# Patient Record
Sex: FEMALE | Race: WHITE | URBAN - METROPOLITAN AREA
[De-identification: names, ages, dates, MRNs, and addresses within clinical notes are randomized per-mention and may not be internally consistent; named-entity substitution may affect disease eponyms.]

---

## 2021-06-08 ENCOUNTER — HOSPITAL ENCOUNTER (OUTPATIENT)
Dept: PHYSICAL THERAPY | Age: 81
Discharge: HOME | End: 2021-06-08
Payer: MEDICARE

## 2021-06-08 PROCEDURE — 97161 PT EVAL LOW COMPLEX 20 MIN: CPT

## 2021-06-08 PROCEDURE — 97016 VASOPNEUMATIC DEVICE THERAPY: CPT

## 2021-06-08 PROCEDURE — 97110 THERAPEUTIC EXERCISES: CPT

## 2021-06-08 NOTE — THERAPY EVALUATION
[id number]}  Jackson Funk  : 1940  [place]  Secondary:  [address]  at [address] [facility], [address] [place] [address], [address]  [phone]   [phone]        OUTPATIENT PHYSICAL THERAPY:Initial Assessment 2021   ICD-10: Treatment Diagnosis: Pain in right shoulder [M25.511]  Other chronic pain [G89.29]  Precautions/Allergies:   Patient has no allergy information on record. TREATMENT PLAN:  Effective Dates: 2021 TO 2021 (90 days). Frequency/Duration: Up to 3 times a week for 90 Day(s) MEDICAL/REFERRING DIAGNOSIS:  Pain in right shoulder [M25.511]  Other chronic pain [G89.29]   DATE OF ONSET: Acute on Chronic 2021  REFERRING PHYSICIAN: Hester Mcardle, MD MD Orders: PT eval & treat  Return MD Appointment:   Future Appointments   Date Time Provider Reginald Corrigan   6/10/2021  7:00 PM Harden Roche, PT [facility]   2021  1:00 PM Harden Roche, PT [facility]   2021 10:15 AM Harden Roche, PT [facility]   2021 10:15 AM Harden Roche, PT [facility]   2021 10:15 AM Harden Roche, PT [facility]   2021 10:15 AM Harden Roche, PT [facility]   2021 10:15 AM Harden Roche, PT [facility]        INITIAL ASSESSMENT:  Ms. Ida Lynn presents w/ R shoulder impingement secondary to scapulothoracic dysfunction w/ supraspinatus TTP modified Hagen Preet's test.  DASH is 17/55 for this patient. Exquisite tenderness right supraspinatus tendon and trigger point right infraspinatus and teres minor. Skilled PT is indicated for this patient's functional mobility deficits. PROBLEM LIST (Impacting functional limitations):  1. Decreased Strength  2. Decreased ADL/Functional Activities  3. Increased Pain  4. Decreased Flexibility/Joint Mobility INTERVENTIONS PLANNED: (Treatment may consist of any combination of the following)  1.  Continuous Passive Motion (CPM)  2. Cryotherapy  3. Home Exercise Program (HEP)  4. Manual Therapy  5. Neuromuscular Re-education/Strengthening  6. Range of Motion (ROM)  7. Therapeutic Activites  8. Therapeutic Exercise/Strengthening  9. Transcutaneous Electrical Nerve Stimulation (TENS)  10. Vasopneumatic compression      GOALS: (Goals have been discussed and agreed upon with patient.)  Short-Term Functional Goals: Time Frame: 6 weeks   1. Pt will improve R shoulder ER to 80 degrees ROM  to be able to participate in daily household activities   2. Pt will decrease pain to 3/10 pain to be able to part take in daily household activities   3. Pt will improve R shoulder IR to 60 degrees ROM to be able to dress herself regularly   Discharge Goals: Time Frame: 12 weeks   5. Pt will improve R shoulder ER to 90 degrees ROM  to be able to participate in daily household activities   6. Pt will decrease pain to 0/10 pain to be able to part take in daily household activities   7. Pt will improve R shoulder IR to 90 degrees ROM to be able to dress herself regularly. Scratch L1/T12     OUTCOME MEASURE:   Tool Used: Disabilities of the Arm, Shoulder and Hand (DASH) Questionnaire - Quick Version  Score:  Initial: 17/55  Most Recent: X/55 (Date: -- )   Interpretation of Score: The DASH is designed to measure the activities of daily living in person's with upper extremity dysfunction or pain. Each section is scored on a 1-5 scale, 5 representing the greatest disability. The scores of each section are added together for a total score of 55. MEDICAL NECESSITY:   · Patient is expected to demonstrate progress in strength, range of motion and functional technique to increase independence with Dressing, bathing and performing ADLs. REASON FOR SERVICES/OTHER COMMENTS:  · Patient has demonstrated an improvement in functional level by increasing ROM w/ isometric exercises .   Total Duration:  PT Patient Time In/Time Out  Time In: 1300  Time Out: 1400    Rehabilitation Potential For Stated Goals: Excellent  Regarding Navya Velasquez's therapy, I certify that the treatment plan above will be carried out by a therapist or under their direction. Thank you for this referral,  Annemarie Goddard PT     Referring Physician Signature: Ray Jo MD No Signature is Required for this note. PAIN/SUBJECTIVE:   Initial:   3/10  Right shoulder  Post Session:  3/10 right shoulder. HISTORY:   History of Injury/Illness (Reason for Referral):  Acute on chronic exacerbation of the R shoulder when lifting objects, and dressing when using the R shldr   Past Medical History/Comorbidities:   Ms. Juan J Higginbotham  has no past medical history on file. Ms. Juan J Higginbotham  has no past surgical history on file. 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 Federal Law prohibits the replication, distribution or use without written permission from [facility] [employer]   Current Medications:     No current outpatient medications on file.    Date Last Reviewed:  6/9/2021   Number of Personal Factors/Comorbidities that affect the Plan of Care: 0: LOW COMPLEXITY   EXAMINATION:   Palpation:    TTP supraspinatus insertion tenderness   TTP infraspinatus   TTP levator scapula/upper trap/scalenes         ROM:                                                             Right                              AROM   STRENGTH   PROM NORMALS  Flexion                    160*        4+                WNL*               160  ABDUCTION          170*        4+                WNL                 160  Extension               50           4+                 WNL                 60  ER                             50*           3+                 WNL*               85   IR                              L1           4+                  WNL*              T12  *indicates antalgic mobility   Strength:      See above  Special Tests:          Neers (-)   Empty can (-)   Modified Shanthi Peers (+) TTP supraspinatus insertion tenderness     Neurological Screen:        Myotomes: WNL        Dermatomes: WNL        Sensation: WNL  Functional Mobility:         Hand behind back limited R>L pain provocation indicated   Coordination:          N/A   Activities of Daily Living:         Reaching   Hand behind back   Arm overhead   Dressing   Driving   Sleeping behaviors lying on R side   Basic ADLs (From Assessment) Complex ADLs (From Assessment)         Grooming/Bathing/Dressing Activities of Daily Living                                    Body Structures Involved:  1. Thoracic Cage  2. Bones  3. Joints  4. Muscles  5. Ligaments Body Functions Affected:  1. Sensory/Pain  2. Neuromusculoskeletal  3. Movement Related Activities and Participation Affected:  1. Mobility  2.  Self Care   Number of elements (examined above) that affect the Plan of Care: 1-2: LOW COMPLEXITY   CLINICAL PRESENTATION:   Presentation: Stable and uncomplicated: LOW COMPLEXITY   CLINICAL DECISION MAKING:   Use of outcome tool(s) and clinical judgement create a POC that gives a: Clear prediction of patient's progress: LOW COMPLEXITY

## 2021-06-08 NOTE — PROGRESS NOTES
Karen Huerta  : 1940  [place]  Secondary:  [address]  at [facility], [facility], [address]  [phone]   [phone]        OUTPATIENT PHYSICAL THERAPY: Daily Treatment Note 2021  Visit Count:  1   ICD-10: Treatment Diagnosis: Pain in right shoulder [M25.511]  Other chronic pain [G89.29]  Precautions/Allergies:   Patient has no allergy information on record. TREATMENT PLAN:  Effective Dates: 2021 TO 2021 (90 days). Frequency/Duration: Up to 3 times a week for 90 Day(s)  Pain in right shoulder [M25.511]  Other chronic pain [G89.29]  MEDICAL/REFERRING DIAGNOSIS:  Pain in right shoulder [M25.511]  Other chronic pain [G89.29]   DATE OF ONSET: Acute on Chronic 2021  REFERRING PHYSICIAN: Verna Ordaz MD  Pre-treatment Symptoms/Complaints:  R shldr pain    Future Appointments   Date Time Provider Reginald Corrigan   6/10/2021  7:00 PM Eston Cedars, PT [facility]   2021  1:00 PM Eston Cedars, [facility]   2021 10:15 AM Eston Cedars, PT [facility]   2021 10:15 AM Eston Cedars, PT [facility]   2021 10:15 AM Eston Cedars, PT [facility]   2021 10:15 AM Eston Cedars, [facility]   2021 10:15 AM Eston Cedars, [facility]       Pain: Initial:  3/10 right shoulder and posterior scapular region  Post Session:  310 right shoulder and posterior scapular region. Medications Last Reviewed:  2021  Updated Objective Findings:  See evaluation note from today  TREATMENT:     THERAPEUTIC ACTIVITY: ( 0 minutes): Therapeutic activities per grid below to improve mobility, strength, balance and dynamic movement of shoulder - right to improve functional lifting, carrying, reaching and overhead activites.   Required no visual, verbal, manual and tactile cues to promote static and dynamic balance in standing, promote coordination of right, upper extremity(s) and promote motor control of right, upper extremity(s). THERAPEUTIC EXERCISE: (30 minutes):  Exercises per grid below to improve mobility, strength, balance, coordination and dynamic movement of shoulder - right to improve functional carrying, reaching, catching and overhead activites. Required no visual, verbal, manual and tactile cues to promote proper body alignment, promote proper body posture and promote proper body mechanics. Progressed resistance, range, repetitions and complexity of movement as indicated. NEUROMUSCULAR RE-EDUCATION: (0 minutes):  Exercise/activities per grid below to improve coordination, posture and proprioception. Required no visual, verbal, manual and tactile cues to promote static and dynamic balance in standing, promote coordination of right, upper extremity(s) and promote motor control of right, upper extremity(s). MANUAL THERAPY: (0 minutes): Joint mobilization, Soft tissue mobilization and Manipulation was utilized and necessary because of the patient's restricted joint motion, painful spasm, loss of articular motion and restricted motion of soft tissue. MODALITIES: (15 minutes):      Vasopneumatic Compression Cold 15 min intermittent moderate compression    1 unit        Date:  Planned Tx            Activity/Exercise Parameters                                UBE  8 mins 4 forward and 4    backward level 4           Prone Y's  3x10 no weight           Standing Y's  3x10 2#          Prone T's  3x10 1#          Standing band pull aparts open can  3x10 red theraband           Isometric  10 x's flexion, abduction, extension, ER, IR standing           Seated Scapular retraction  10x's/5 second holds           Access Code: [email or web address]  URL: [email or web address]  Date: 06/09/2021  Prepared by:  Arun Whiting    Exercises  Isometric Shoulder Flexion - 2 x daily - 7 x weekly - 10 reps - 1 sets - 5 hold  Isometric Shoulder External Rotation - 2 x daily - 7 x weekly - 10 reps - 1 sets - 5 hold  Isometric Shoulder Internal Rotation - 2 x daily - 7 x weekly - 10 reps - 1 sets - 5 hold  Isometric Shoulder Adduction - 2 x daily - 7 x weekly - 10 reps - 1 sets - 5 hold  Isometric Shoulder Abduction at Wall - 2 x daily - 7 x weekly - 10 reps - 1 sets - 5 hold  Isometric Shoulder Extension at Wall - 2 x daily - 7 x weekly - 10 reps - 1 sets - 5 hold  Standing Scapular Retraction - 2 x daily - 7 x weekly - 10 reps - 1 sets - 5 hold    Patient Education  Ice  Shoulder Impingement    MedBridge Portal  Treatment/Session Summary:    · Response to Treatment:  Pt tolerated tx well . Patient perception of improved motion post treatment. · Communication/Consultation:  HEP program  Given to patient.       · Equipment provided today:  None today  · Recommendations/Intent for next treatment session: Next visit will focus on dynamic movement and stability on the R shldr    Total Treatment Billable Duration:  45 minutes  PT Patient Time In/Time Out  Time In: 1300  Time Out: [address] [place], [place]    Future Appointments   Date Time Provider Reginald Corrigan   6/10/2021  7:00 PM Tamia Yolette, PT [facility]   6/14/2021  1:00 PM Tamia Yolette, PT [facility]   6/17/2021 10:15 AM Tamia Rain, PT [facility]   6/21/2021 10:15 AM Tamia Rain, PT [facility]   6/24/2021 10:15 AM Tamia Rain, PT [facility]   6/28/2021 10:15 AM [facility]   7/1/2021 10:15 AM Tamia Rain, PT [facility]     Visit Approval Visit # Therapist initials Date A NS / Cx < 24 hr >24 hr Cx Comments    1 Arun  6/8/21 [x]  [] [] Initial evaluation       [] [] []        [] [] []        [] [] []        [] [] []        [] [] []        [] [] []        [] [] []        [] [] []        [] [] []        [] [] []        [] [] []        [] [] []        [] [] []        [] [] []        [] [] [] [] [] []        [] [] []

## 2021-06-08 NOTE — PROGRESS NOTES
PER DR. Bay Costa MD, ORTHOPEDIST  5/17/21  She has a progressive worsening chronic condition in her right shoulder. The symptoms are significant and interfering with activities of daily living. She has been on prescription strength ibuprofen, activity modifications without any significant improvement. The symptoms are interfering with activities of daily living. X-rays are negative. I spoke with her at length about her findings and treatment options. I went over this in detail. At this point given the chronicity and severity of her symptoms will obtain an MRI. We will talk about treatment options afterwards. She may need therapy, injection, surgery etc. I answered her questions. She is amenable to plan. 6/1/21 Had a long discussion the patient. I talked her about her continued findings and treatment options. I spoke about the natural history of a torn rotator cuff in her age group with conservative through operative treatment. I went over this in detail and answered her questions. I have given her a prescription for ibuprofen 600 mg. Also we will begin physical therapy at [facility]. Follow-up in 6 weeks. I would not recommend a cortisone injection I went over this in detail as to the reasoning. If her symptoms are recalcitrant she may need rotator cuff repair. She is amenable to plan.

## 2021-06-10 ENCOUNTER — APPOINTMENT (OUTPATIENT)
Dept: PHYSICAL THERAPY | Age: 81
End: 2021-06-10
Payer: MEDICARE

## 2021-06-14 ENCOUNTER — HOSPITAL ENCOUNTER (OUTPATIENT)
Dept: PHYSICAL THERAPY | Age: 81
Discharge: HOME | End: 2021-06-14
Payer: MEDICARE

## 2021-06-14 PROCEDURE — 97014 ELECTRIC STIMULATION THERAPY: CPT

## 2021-06-14 PROCEDURE — 97016 VASOPNEUMATIC DEVICE THERAPY: CPT

## 2021-06-14 PROCEDURE — 97140 MANUAL THERAPY 1/> REGIONS: CPT

## 2021-06-14 PROCEDURE — 97110 THERAPEUTIC EXERCISES: CPT

## 2021-06-14 NOTE — PROGRESS NOTES
Adama Juliana  : 1940  [place]  Secondary:  [address]  at [facility]  [address], [address] [facility] [place], [address]  [phone]   [phone]        OUTPATIENT PHYSICAL THERAPY: Daily Treatment Note 2021  Visit Count:  2   ICD-10: Treatment Diagnosis: Pain in right shoulder [M25.511]  Other chronic pain [G89.29]  Precautions/Allergies:   Patient has no allergy information on record. TREATMENT PLAN:  Effective Dates: 2021 TO 2021 (90 days). Frequency/Duration: Up to 3 times a week for 90 Day(s)  Pain in right shoulder [M25.511]  Other chronic pain [G89.29]  MEDICAL/REFERRING DIAGNOSIS:  Pain in right shoulder [M25.511]  Other chronic pain [G89.29]   DATE OF ONSET: Acute on Chronic 2021  REFERRING PHYSICIAN: Ad Wood MD  Pre-treatment Symptoms/Complaints:  R shldr pain    Future Appointments   Date Time Provider Reginald Corrigan   2021 10:15 AM Leonardo Veronica, PT [facility]   2021 10:15 AM Leonardo Veronica, PT [facility]   2021 10:15 AM Leonardo Veronica, PT [facility]   2021 10:15 AM Leonardo Veronica, PT [facility]   2021 10:15 AM Leonardo Veronica, [facility]       Pain: Initial:  310 right shoulder and posterior scapular region  Post Session:  210 right shoulder and posterior scapular region. Medications Last Reviewed:  2021  Updated Objective Findings: PER initial evaluation. TREATMENT:     THERAPEUTIC ACTIVITY: ( 0 minutes): Therapeutic activities per grid below to improve mobility, strength, balance and dynamic movement of shoulder - right to improve functional lifting, carrying, reaching and overhead activites.   Required no visual, verbal, manual and tactile cues to promote static and dynamic balance in standing, promote coordination of right, upper extremity(s) and promote motor control of right, upper extremity(s). THERAPEUTIC EXERCISE: (45 minutes):  Exercises per grid below to improve mobility, strength, balance, coordination and dynamic movement of shoulder - right to improve functional carrying, reaching, catching and overhead activites. Required no visual, verbal, manual and tactile cues to promote proper body alignment, promote proper body posture and promote proper body mechanics. Progressed resistance, range, repetitions and complexity of movement as indicated. NEUROMUSCULAR RE-EDUCATION: (0 minutes):  Exercise/activities per grid below to improve coordination, posture and proprioception. Required no visual, verbal, manual and tactile cues to promote static and dynamic balance in standing, promote coordination of right, upper extremity(s) and promote motor control of right, upper extremity(s). MANUAL THERAPY: (8 minutes): Joint mobilization, Soft tissue mobilization and Manipulation was utilized and necessary because of the patient's restricted joint motion, painful spasm, loss of articular motion and restricted motion of soft tissue. MODALITIES: (15 minutes):      Vasopneumatic Compression Cold 15 min intermittent moderate compression    1 unit  overlap with therapetuic exercise. Date:  6/15/21             Activity/Exercise Parameters                     Manual Posterior/Inferior glides  8 mins            UBE  8 mins 4 forward and 4    backward level 4           Wall Roly's 8 mins           Standing Y's  3x10 2#          Prone T's  3x10 1#          Standing band pull aparts open can  3x10 red theraband           Isometric  10 x's flexion, abduction, extension, ER, IR standing           Seated Scapular retraction  10x's/5 second holds           Access Code: [email or web address]  URL: [email or web address]  Date: 06/09/2021  Prepared by:  Arun Johanne    Exercises  Isometric Shoulder Flexion - 2 x daily - 7 x weekly - 10 reps - 1 sets - 5 hold  Isometric Shoulder External Rotation - 2 x daily - 7 x weekly - 10 reps - 1 sets - 5 hold  Isometric Shoulder Internal Rotation - 2 x daily - 7 x weekly - 10 reps - 1 sets - 5 hold  Isometric Shoulder Adduction - 2 x daily - 7 x weekly - 10 reps - 1 sets - 5 hold  Isometric Shoulder Abduction at Wall - 2 x daily - 7 x weekly - 10 reps - 1 sets - 5 hold  Isometric Shoulder Extension at Wall - 2 x daily - 7 x weekly - 10 reps - 1 sets - 5 hold  Standing Scapular Retraction - 2 x daily - 7 x weekly - 10 reps - 1 sets - 5 hold    Patient Education  Ice  Shoulder Impingement    MedBridge Portal  Treatment/Session Summary:    · Response to Treatment:  Pt tolerated tx well . Patient perception of improved motion post treatment. Will continue to monitor s/s   · Communication/Consultation:  HEP program  Given to patient.       · Equipment provided today:  None today  · Recommendations/Intent for next treatment session: Next visit will focus on dynamic movement and stability on the R shldr    Total Treatment Billable Duration:  53 minutes  PT Patient Time In/Time Out  Time In: 1255  Time Out: [place] [id number], [place]    Future Appointments   Date Time Provider Reginald Corrigan   6/17/2021 10:15 AM Samuel Montoya, PT [facility]   6/21/2021 10:15 AM Samuel Montoya, PT [facility]   6/24/2021 10:15 AM Samuel Montoya, PT [facility]   6/28/2021 10:15 AM Samuel Montoya, PT [facility]   7/1/2021 10:15 AM Samuel Montoya, PT [facility]     Visit Approval Visit # Therapist initials Date A NS / Cx < 24 hr >24 hr Cx Comments    1 Arun  6/8/21 [x]  [] [] Initial evaluation    2 GP/Arun 6/14/21 [x] [] []        [] [] []        [] [] []        [] [] []        [] [] []        [] [] []        [] [] []        [] [] []        [] [] []        [] [] []        [] [] []        [] [] []        [] [] []        [] [] []        [] [] []        [] [] []        [] [] []

## 2021-06-17 ENCOUNTER — HOSPITAL ENCOUNTER (OUTPATIENT)
Dept: PHYSICAL THERAPY | Age: 81
Discharge: HOME | End: 2021-06-17
Payer: MEDICARE

## 2021-06-17 PROCEDURE — 97110 THERAPEUTIC EXERCISES: CPT

## 2021-06-17 PROCEDURE — 97016 VASOPNEUMATIC DEVICE THERAPY: CPT

## 2021-06-17 PROCEDURE — 97014 ELECTRIC STIMULATION THERAPY: CPT

## 2021-06-17 NOTE — PROGRESS NOTES
Enid Gayle  : 1940  [place]  Secondary:  [address]  at [facility]  [address], [address] [facility], [address]  [phone]   [phone]        OUTPATIENT PHYSICAL THERAPY: Daily Treatment Note 2021  Visit Count:  3   ICD-10: Treatment Diagnosis: Pain in right shoulder [M25.511]  Other chronic pain [G89.29]  Precautions/Allergies:   Patient has no allergy information on record. TREATMENT PLAN:  Effective Dates: 2021 TO 2021 (90 days). Frequency/Duration: Up to 3 times a week for 90 Day(s)  Pain in right shoulder [M25.511]  Other chronic pain [G89.29]  MEDICAL/REFERRING DIAGNOSIS:  Pain in right shoulder [M25.511]  Other chronic pain [G89.29]   DATE OF ONSET: Acute on Chronic 2021  REFERRING PHYSICIAN: Roberto Carlos Chan MD  Pre-treatment Symptoms/Complaints:  R shldr pain, pt reports a slight improvement in pain but pain is still in present in the R shldr     Future Appointments   Date Time Provider Reginald Corrigan   2021 10:15 AM Nick Linger, PT [facility]   2021 10:15 AM Nick Linger, PT [facility]   2021 10:15 AM Nick Linger, PT [facility]   2021 10:15 AM Nick Linger, PT [facility]       Pain: Initial:  6/10 right shoulder and posterior scapular region  Post Session:  4/10 right shoulder and posterior scapular region. Medications Last Reviewed:  2021  Updated Objective Findings: PER initial evaluation. TREATMENT:     THERAPEUTIC ACTIVITY: ( 0 minutes): Therapeutic activities per grid below to improve mobility, strength, balance and dynamic movement of shoulder - right to improve functional lifting, carrying, reaching and overhead activites.   Required no visual, verbal, manual and tactile cues to promote static and dynamic balance in standing, promote coordination of right, upper extremity(s) and promote motor control of right, upper extremity(s). THERAPEUTIC EXERCISE: (45 minutes):  Exercises per grid below to improve mobility, strength, balance, coordination and dynamic movement of shoulder - right to improve functional carrying, reaching, catching and overhead activites. Required no visual, verbal, manual and tactile cues to promote proper body alignment, promote proper body posture and promote proper body mechanics. Progressed resistance, range, repetitions and complexity of movement as indicated. NEUROMUSCULAR RE-EDUCATION: (0 minutes):  Exercise/activities per grid below to improve coordination, posture and proprioception. Required no visual, verbal, manual and tactile cues to promote static and dynamic balance in standing, promote coordination of right, upper extremity(s) and promote motor control of right, upper extremity(s). MANUAL THERAPY: (8 minutes): Joint mobilization, Soft tissue mobilization and Manipulation was utilized and necessary because of the patient's restricted joint motion, painful spasm, loss of articular motion and restricted motion of soft tissue. MODALITIES: (15 minutes):      Vasopneumatic Compression Cold 15 min intermittent moderate compression    1 unit  overlap with therapetuic exercise. Date:  6/17/21             Activity/Exercise Parameters          Manual supine 90/90 PROM R shldr ER/IR  4 mins           Manual supine  Posterior/Inferior glides grade 2-3 force MOBs R Shldr  4 mins            UBE  8 mins 4 forward and 4    backward level 4           Wall Roly's 2 x 20 8 mins           Standing Y's Dumbbell 3x10 3#          Prone T's Dumbbell 3x10 1#          Standing band pull aparts open can  3x10 red theraband           Isometric  10 x's flexion, abduction, extension, ER, IR standing           Seated Scapular retraction  10x's/5 second holds           Access Code: [email or web address]  URL: [email or web address]  Date: 06/09/2021  Prepared by:  Arun Pascal  Isometric Shoulder Flexion - 2 x daily - 7 x weekly - 10 reps - 1 sets - 5 hold  Isometric Shoulder External Rotation - 2 x daily - 7 x weekly - 10 reps - 1 sets - 5 hold  Isometric Shoulder Internal Rotation - 2 x daily - 7 x weekly - 10 reps - 1 sets - 5 hold  Isometric Shoulder Adduction - 2 x daily - 7 x weekly - 10 reps - 1 sets - 5 hold  Isometric Shoulder Abduction at Wall - 2 x daily - 7 x weekly - 10 reps - 1 sets - 5 hold  Isometric Shoulder Extension at Wall - 2 x daily - 7 x weekly - 10 reps - 1 sets - 5 hold  Standing Scapular Retraction - 2 x daily - 7 x weekly - 10 reps - 1 sets - 5 hold    Patient Education  Ice  Shoulder Impingement    Med[employer] Portal  Treatment/Session Summary:    · Response to Treatment:  Pt tolerated tx well . Patient perception of improved motion post treatment. Pt reports an improvement w/ pain but pain is still present in the R shldr. Will continue to monitor s/s   · Communication/Consultation:  HEP program  Given to patient. · Equipment provided today:  None today  · Recommendations/Intent for next treatment session: Next visit will focus on dynamic movement and stability on the R shldr    Total Treatment Billable Duration:  53 minutes + VS Compression concurrent with therapeutic exercise.        PT Patient Time In/Time Out  Time In: 6029  Time Out: [address] [place], [place]    Future Appointments   Date Time Provider Reginald Corrigan   6/21/2021 10:15 AM Marshal Silva, PT [facility]   6/24/2021 10:15 AM Marshal Silva, PT [facility]   6/28/2021 10:15 AM Marshal Bictates, PT [facility]   7/1/2021 10:15 AM Marshal Bictates, PT [facility]     Visit Approval Visit # Therapist initials Date A NS / Cx < 24 hr >24 hr Cx Comments    1 Arun  6/8/21 [x]  [] [] Initial evaluation    2 GP/Arun 6/14/21 [x] [] []     3 GP/Arun 6/17/21 [x] [] []        [] [] []        [] [] []        [] [] []        [] [] []        [] [] []        [] [] []        [] [] [] [] [] []        [] [] []        [] [] []        [] [] []        [] [] []        [] [] []        [] [] []        [] [] []

## 2021-06-21 ENCOUNTER — HOSPITAL ENCOUNTER (OUTPATIENT)
Dept: PHYSICAL THERAPY | Age: 81
Discharge: HOME | End: 2021-06-21
Payer: MEDICARE

## 2021-06-21 PROCEDURE — 97140 MANUAL THERAPY 1/> REGIONS: CPT

## 2021-06-21 PROCEDURE — 97016 VASOPNEUMATIC DEVICE THERAPY: CPT

## 2021-06-21 PROCEDURE — 97110 THERAPEUTIC EXERCISES: CPT

## 2021-06-21 NOTE — PROGRESS NOTES
Janell Horowitz  : 1940  [place]  Secondary:  [address]  at [facility]  [address], [address] [facility], [place] [address] [address] [facility]  [phone]   [id number] [id number]        OUTPATIENT PHYSICAL THERAPY: Daily Treatment Note 2021  Visit Count:  4   ICD-10: Treatment Diagnosis: Pain in right shoulder [M25.511]  Other chronic pain [G89.29]  Precautions/Allergies:   Patient has no allergy information on record. TREATMENT PLAN:  Effective Dates: 2021 TO 2021 (90 days). Frequency/Duration: Up to 3 times a week for 90 Day(s)  Pain in right shoulder [M25.511]  Other chronic pain [G89.29]  MEDICAL/REFERRING DIAGNOSIS:  Pain in right shoulder [M25.511]  Other chronic pain [G89.29]   DATE OF ONSET: Acute on Chronic 2021  REFERRING PHYSICIAN: Matt Cowan MD  Pre-treatment Symptoms/Complaints:  R shldr pain, pt reports a slight improvement in pain but pain is still in present in the R shldr. Pt stated that pain was increased when lifting objects that were overhead and objects that weighed over 10 Ibs. Future Appointments   Date Time Provider Reginlad Corrigan   2021 10:15 AM Gilberto Velázquez PT [facility]   2021 10:15 AM Gilberto Velázquez PT [facility]   2021 10:15 AM Gilberto Velázquez PT [facility]       Pain: Initial:  6/10 right shoulder and posterior scapular region. Pain w/ reaching overhead or lifting heavy objects >10 Ibs. Post Session:  4/10 right shoulder and posterior scapular region. Improvement in pain w/ manual intervention. Pt was able to achieve more AROM w/o pain in the RUE. Medications Last Reviewed:  2021  Updated Objective Findings: PER initial evaluation. TREATMENT:     THERAPEUTIC ACTIVITY: ( 0 minutes):   Therapeutic activities per grid below to improve mobility, strength, balance and dynamic movement of shoulder - right to improve functional lifting, carrying, reaching and overhead activites. Required no visual, verbal, manual and tactile cues to promote static and dynamic balance in standing, promote coordination of right, upper extremity(s) and promote motor control of right, upper extremity(s). THERAPEUTIC EXERCISE: (45 minutes):  Exercises per grid below to improve mobility, strength, balance, coordination and dynamic movement of shoulder - right to improve functional carrying, reaching, catching and overhead activites. Required no visual, verbal, manual and tactile cues to promote proper body alignment, promote proper body posture and promote proper body mechanics. Progressed resistance, range, repetitions and complexity of movement as indicated. Slow performance with additional reflection with block practice. NEUROMUSCULAR RE-EDUCATION: (0 minutes):  Exercise/activities per grid below to improve coordination, posture and proprioception. Required no visual, verbal, manual and tactile cues to promote static and dynamic balance in standing, promote coordination of right, upper extremity(s) and promote motor control of right, upper extremity(s). MANUAL THERAPY: (8 minutes): Joint mobilization, Soft tissue mobilization and Manipulation was utilized and necessary because of the patient's restricted joint motion, painful spasm, loss of articular motion and restricted motion of soft tissue. MODALITIES: (15 minutes):      Vasopneumatic Compression Cold 15 min intermittent moderate compression    1 unit  pt is seated, compression sleeve covered posterior/anterior/lateral aspects of the RUE, intermittent moderate compression at 44 degrees. Pt used a pillow for RUE support into horizontal ABD/FLX/IR of the RUE when receiving cryotherapy.       Date:  6/21/21             MANUAL 8 mins           Manual supine 90/90 PROM R shldr ER/IR  4 mins           Manual supine  Posterior/Inferior glides grade 2-3 force MOBs R Shldr  4 mins            Activity/Exercise 45 mins           UBE  8 mins 4 forward and 4    backward level 4           Wall Roly's 2 x 20 8 mins           Standing Y's Dumbbell 3x10 3#          Prone T's Dumbbell 3x10 1#          Standing band pull aparts open can  3x10 red theraband           Isometric  10 x's flexion, abduction, extension, ER, IR standing           Seated Scapular retraction  10x's/5 second holds           Access Code: [email or web address]  URL: [email or web address]  Date: 06/09/2021  Prepared by: Arun Johanne    Exercises  Isometric Shoulder Flexion - 2 x daily - 7 x weekly - 10 reps - 1 sets - 5 hold  Isometric Shoulder External Rotation - 2 x daily - 7 x weekly - 10 reps - 1 sets - 5 hold  Isometric Shoulder Internal Rotation - 2 x daily - 7 x weekly - 10 reps - 1 sets - 5 hold  Isometric Shoulder Adduction - 2 x daily - 7 x weekly - 10 reps - 1 sets - 5 hold  Isometric Shoulder Abduction at Wall - 2 x daily - 7 x weekly - 10 reps - 1 sets - 5 hold  Isometric Shoulder Extension at Wall - 2 x daily - 7 x weekly - 10 reps - 1 sets - 5 hold  Standing Scapular Retraction - 2 x daily - 7 x weekly - 10 reps - 1 sets - 5 hold    Patient Education  Ice  Shoulder Impingement    [employer] Portal  Treatment/Session Summary:    · Response to Treatment:  Pt exhibited slight pain when assessing ROM. When performing manual interventions to the RUE, the pt received more AROM w/o pain. The pt's ability to gain strength and stability is evident but would still need skilled therapy to address the pt's remaining deficits. Will continue to monitor s/s   · Communication/Consultation:  HEP program  Given to patient. · Equipment provided today:  None today  · Recommendations/Intent for next treatment session: Next visit will focus on dynamic movement and stability on the R shldr. Dynamic there-ex tx will be mostly guided on the lvl of irritability and pain.  Once pt can achieve an optimal lvl ROM and comfort, the pt will begin to progress to a more dynamic stability exercise program.       Total Treatment Billable Duration:  55 minutes times and [address]    PT Patient Time In/Time Out  Time In: 1010  Time Out: [address] [place], [place]    Future Appointments   Date Time Provider Reginald Meenu   6/24/2021 10:15 AM Nick Davis, PT [facility]   6/28/2021 10:15 AM Nick Davis, PT [facility]   7/1/2021 10:15 AM Nick Davis, PT [facility]     Visit Approval Visit # Therapist initials Date A NS / Cx < 24 hr >24 hr Cx Comments    1 Arun  6/8/21 [x]  [] [] Initial evaluation    2 GP/Arun 6/14/21 [x] [] []     3 GP/Arun 6/17/21 [x] [] []     4 GP/Arun 6/21/21 [x] [] []        [] [] []        [] [] []        [] [] []        [] [] []        [] [] []        [] [] []        [] [] []        [] [] []        [] [] []        [] [] []        [] [] []        [] [] []        [] [] []        [] [] []

## 2021-06-24 ENCOUNTER — HOSPITAL ENCOUNTER (OUTPATIENT)
Dept: PHYSICAL THERAPY | Age: 81
Discharge: HOME | End: 2021-06-24
Payer: MEDICARE

## 2021-06-24 PROCEDURE — 97110 THERAPEUTIC EXERCISES: CPT

## 2021-06-24 PROCEDURE — 97140 MANUAL THERAPY 1/> REGIONS: CPT

## 2021-06-24 PROCEDURE — 97016 VASOPNEUMATIC DEVICE THERAPY: CPT

## 2021-06-24 NOTE — PROGRESS NOTES
Burak Sanders  : 1940  [place]  Secondary:   [place]  at [facility]  [address], [address] [facility], [address]  [phone]   [phone]        OUTPATIENT PHYSICAL THERAPY: Daily Treatment Note 2021  Visit Count:  5   ICD-10: Treatment Diagnosis: Pain in right shoulder [M25.511]  Other chronic pain [G89.29]  Precautions/Allergies:   Patient has no allergy information on record. TREATMENT PLAN:  Effective Dates: 2021 TO 2021 (90 days). Frequency/Duration: Up to 3 times a week for 90 Day(s)  Pain in right shoulder [M25.511]  Other chronic pain [G89.29]  MEDICAL/REFERRING DIAGNOSIS:  Pain in right shoulder [M25.511]  Other chronic pain [G89.29]   DATE OF ONSET: Acute on Chronic 2021  REFERRING PHYSICIAN: Tangela Saavedra MD  Pre-treatment Symptoms/Complaints:  R shldr pain, pt reports a slight improvement in pain but pain is still in present in the R shldr. Pt stated that pain was increased when lifting objects that were overhead and objects that weighed over 10 Ibs. Pt felt signficantly better after last tx however some elevated pain due to more exercises at home. Pt states that the pain is around the R posterior-superior scapular region       Future Appointments   Date Time Provider Reginald Corrigan   2021 10:15 AM Magno Castro PT [facility]   2021 10:15 AM Magno Castro PT [facility] SFD       Pain: Initial:  6/10 right shoulder and posterior scapular region. Pain increased from doing more exercises at home/lifting objects >10Ibs. Post Session:  4/10 right shoulder and posterior scapular region. Improvement in pain w/ manual intervention. Pt was able to achieve more AROM w/o pain in the RUE. Medications Last Reviewed:  2021  Updated Objective Findings: PER initial evaluation. TREATMENT:     THERAPEUTIC ACTIVITY: ( 0 minutes):   Therapeutic activities per grid below to improve mobility, strength, balance and dynamic movement of shoulder - right to improve functional lifting, carrying, reaching and overhead activites. Required no visual, verbal, manual and tactile cues to promote static and dynamic balance in standing, promote coordination of right, upper extremity(s) and promote motor control of right, upper extremity(s). THERAPEUTIC EXERCISE: (45 minutes):  Exercises per grid below to improve mobility, strength, balance, coordination and dynamic movement of shoulder - right to improve functional carrying, reaching, catching and overhead activites. Required no visual, verbal, manual and tactile cues to promote proper body alignment, promote proper body posture and promote proper body mechanics. Progressed resistance, range, repetitions and complexity of movement as indicated. Slow performance with additional reflection with block practice. NEUROMUSCULAR RE-EDUCATION: (0 minutes):  Exercise/activities per grid below to improve coordination, posture and proprioception. Required no visual, verbal, manual and tactile cues to promote static and dynamic balance in standing, promote coordination of right, upper extremity(s) and promote motor control of right, upper extremity(s). MANUAL THERAPY: (8 minutes): Joint mobilization, Soft tissue mobilization and Manipulation was utilized and necessary because of the patient's restricted joint motion, painful spasm, loss of articular motion and restricted motion of soft tissue. MODALITIES: (15 minutes):      Vasopneumatic Compression Cold 15 min intermittent moderate compression    1 unit  pt is seated, compression sleeve covered posterior/anterior/lateral aspects of the RUE, intermittent moderate compression at 44 degrees. Pt used a pillow for RUE support into horizontal ABD/FLX/IR of the RUE when receiving cryotherapy.       Date:  6/24/21             MANUAL 8 mins           Manual supine 90/90 PROM R shldr ER/IR  4 mins Manual supine  Posterior/Inferior glides grade 2-3 force MOBs R Shldr  4 mins            Activity/Exercise 45 mins           UBE  8 mins 4 forward and 4    backward level 4           Wall Roly's 2 x 20 8 mins           Standing Y's Dumbbell 3x10 3#          Prone T's Dumbbell 3x10 1#          Standing band pull aparts open can  3x10 red theraband           Isometric  10 x's flexion, abduction, extension, ER, IR standing           Seated Scapular retraction  10x's/5 second holds           Access Code: [email or web address]  URL: [email or web address]  Date: 06/09/2021  Prepared by: Arun [place]    Exercises  Isometric Shoulder Flexion - 2 x daily - 7 x weekly - 10 reps - 1 sets - 5 hold  Isometric Shoulder External Rotation - 2 x daily - 7 x weekly - 10 reps - 1 sets - 5 hold  Isometric Shoulder Internal Rotation - 2 x daily - 7 x weekly - 10 reps - 1 sets - 5 hold  Isometric Shoulder Adduction - 2 x daily - 7 x weekly - 10 reps - 1 sets - 5 hold  Isometric Shoulder Abduction at Wall - 2 x daily - 7 x weekly - 10 reps - 1 sets - 5 hold  Isometric Shoulder Extension at Wall - 2 x daily - 7 x weekly - 10 reps - 1 sets - 5 hold  Standing Scapular Retraction - 2 x daily - 7 x weekly - 10 reps - 1 sets - 5 hold    Patient Education  Ice  Shoulder Impingement    [facility] Portal  Treatment/Session Summary:    · Response to Treatment:  Pt exhibited slight pain when assessing ROM. When performing manual interventions to the RUE, the pt received more AROM w/o pain. Pt does report an increase in pain after increasing more exercises at home. Communication/Consultation:  HEP program  Given to patient. · Equipment provided today:  None today  · Recommendations/Intent for next treatment session: Next visit will focus on dynamic movement and stability on the R shldr. Dynamic there-ex tx will be mostly guided on the lvl of irritability and pain.  Once pt can achieve an optimal lvl ROM and comfort, the pt will begin to progress to a more dynamic stability exercise program. Pt will progress PRN at this moment in time in time. The pt's ability to gain strength and stability is evident but would still need skilled therapy to address the pt's remaining deficits.   Will continue to monitor s/s   ·     Total Treatment Billable Duration:  55 minutes times and [address]    PT Patient Time In/Time Out  Time In: 1010  Time Out: [address] [place], [place]    Future Appointments   Date Time Provider Reginald Corrigan   6/28/2021 10:15 AM Leonardo Veronica, PT [facility]   7/1/2021 10:15 AM Leonardo Veronica, PT [facility] SFD     Visit Approval Visit # Therapist initials Date A NS / Cx < 24 hr >24 hr Cx Comments    1 Arun  6/8/21 [x]  [] [] Initial evaluation    2 GP/Arun 6/14/21 [x] [] []     3 GP/Arun 6/17/21 [x] [] []     4 GP/Arun 6/21/21 [x] [] []     5 GP/Arun 6/24/21 [x] [] []        [] [] []        [] [] []        [] [] []        [] [] []        [] [] []        [] [] []        [] [] []        [] [] []        [] [] []        [] [] []        [] [] []        [] [] []        [] [] []

## 2021-06-28 ENCOUNTER — HOSPITAL ENCOUNTER (OUTPATIENT)
Dept: PHYSICAL THERAPY | Age: 81
Discharge: HOME | End: 2021-06-28
Payer: MEDICARE

## 2021-06-28 PROCEDURE — 97016 VASOPNEUMATIC DEVICE THERAPY: CPT

## 2021-06-28 PROCEDURE — 97140 MANUAL THERAPY 1/> REGIONS: CPT

## 2021-06-28 PROCEDURE — 97110 THERAPEUTIC EXERCISES: CPT

## 2021-06-28 NOTE — PROGRESS NOTES
Goldie Urena  : 1940  [place]  Secondary:   [place]  at [facility]  [address] [facility], [address]  [phone]   [phone]        OUTPATIENT PHYSICAL THERAPY: Daily Treatment Note 2021  Visit Count:  6   ICD-10: Treatment Diagnosis: Pain in right shoulder [M25.511]  Other chronic pain [G89.29]  Precautions/Allergies:   Patient has no allergy information on record. TREATMENT PLAN:  Effective Dates: 2021 TO 2021 (90 days). Frequency/Duration: Up to 3 times a week for 90 Day(s)  Pain in right shoulder [M25.511]  Other chronic pain [G89.29]  MEDICAL/REFERRING DIAGNOSIS:  Pain in right shoulder [M25.511]  Other chronic pain [G89.29]   DATE OF ONSET: Acute on Chronic 2021  REFERRING PHYSICIAN: Ruth Solis MD  Pre-treatment Symptoms/Complaints:  Pt states that the pain has subsided for the majority of this past weekend. Pt states pain has decreased overall and now can achieve more RUE AROM w/o the presence of a painful arch. Pt still notes that HEP RUE isometrics still cause some minor irritation. Future Appointments   Date Time Provider Reginald Corrigan   2021 10:15 AM [place], PT [facility]   2021  9:30 AM [place], PT [facility]   2021 10:15 AM [place], PT [facility] S[facility]       Pain: Initial:  4/10 right shoulder and posterior scapular region still present, reduction in painful RUE arc has been shown via RUE AROM observation. Post Session:  3/10 right shoulder and posterior scapular region. 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 TREATMENT:     THERAPEUTIC ACTIVITY: ( 0 minutes):   Therapeutic activities per grid below to improve mobility, strength, balance and dynamic movement of shoulder - right to improve functional lifting, carrying, reaching and overhead activites. Required no visual, verbal, manual and tactile cues to promote static and dynamic balance in standing, promote coordination of right, upper extremity(s) and promote motor control of right, upper extremity(s). THERAPEUTIC EXERCISE: (45 minutes):  Exercises per grid below to improve mobility, strength, balance, coordination and dynamic movement of shoulder - right to improve functional carrying, reaching, catching and overhead activites. Required no visual, verbal, manual and tactile cues to promote proper body alignment, promote proper body posture and promote proper body mechanics. Progressed resistance, range, repetitions and complexity of movement as indicated. Slow performance with additional reflection with block practice. NEUROMUSCULAR RE-EDUCATION: (0 minutes):  Exercise/activities per grid below to improve coordination, posture and proprioception. Required no visual, verbal, manual and tactile cues to promote static and dynamic balance in standing, promote coordination of right, upper extremity(s) and promote motor control of right, upper extremity(s). MANUAL THERAPY: (8 minutes): Joint mobilization, Soft tissue mobilization and Manipulation was utilized and necessary because of the patient's restricted joint motion, painful spasm, loss of articular motion and restricted motion of soft tissue. MODALITIES: (15 minutes):      Vasopneumatic Compression Cold 15 min intermittent moderate compression    1 unit  pt is seated, compression sleeve covered posterior/anterior/lateral aspects of the RUE, intermittent moderate compression at 44 degrees. Pt used a pillow for RUE support into horizontal ABD/FLX/IR of the RUE when receiving cryotherapy.       Date:  6/28/21             MANUAL 8 mins           Manual supine 90/90 PROM R shldr ER/IR  4 mins           Manual supine  Posterior/Inferior glides grade 2-3 force MOBs R Shldr  4 mins            Activity/Exercise 45 mins           UBE  8 mins 4 forward and 4    backward level 4           Wall Roly's 2 x 20 8 mins           Standing Y's Dumbbell 3x10 3#          Standing T's Dumbbell 3x10 1#          Standing band pull aparts open can  3x10 red theraband           Isometric  held          Seated Scapular retraction  held          Access Code: [email or web address]  URL: [email or web address]  Date: 06/09/2021  Prepared by: Arun [place]    Exercises  Isometric Shoulder Flexion - 2 x daily - 7 x weekly - 10 reps - 1 sets - 5 hold  Isometric Shoulder External Rotation - 2 x daily - 7 x weekly - 10 reps - 1 sets - 5 hold  Isometric Shoulder Internal Rotation - 2 x daily - 7 x weekly - 10 reps - 1 sets - 5 hold  Isometric Shoulder Adduction - 2 x daily - 7 x weekly - 10 reps - 1 sets - 5 hold  Isometric Shoulder Abduction at Wall - 2 x daily - 7 x weekly - 10 reps - 1 sets - 5 hold  Isometric Shoulder Extension at Wall - 2 x daily - 7 x weekly - 10 reps - 1 sets - 5 hold  Standing Scapular Retraction - 2 x daily - 7 x weekly - 10 reps - 1 sets - 5 hold    Patient Education  Ice  Shoulder Impingement    [facility] Portal  Treatment/Session Summary:    · Response to Treatment:  Pt exhibited slight pain when assessing ROM. When performing manual interventions to the RUE, the pt received more AROM w/o pain. Pt does report an increase in pain after increasing more exercises at home, education was given to tx about holding RUE isometrics til irritation is decreased. ·  Communication/Consultation:  HEP program  Given to patient. · Equipment provided today:  None today  · Recommendations/Intent for next treatment session: Next visit will focus on dynamic movement and stability on the R shldr. Dynamic there-ex tx will be mostly guided on the lvl of irritability and pain.  Once pt can achieve an optimal lvl ROM and comfort, the pt will begin to progress to a more dynamic stability exercise program. Pt will progress PRN at this moment in time in time. Pt has made some available space in the subacromial region. The pt's ability to gain strength and stability is evident but would still need skilled therapy to address the pt's remaining deficits.   Will continue to monitor s/s    ·     Total Treatment Billable Duration:  53 minutes times and [address]    PT Patient Time In/Time Out  Time In: 1005  Time Out: 181 Ana Sidhu PT    Future Appointments   Date Time Provider Reginald Corrigan   7/1/2021 10:15 AM Ezequiel Miguel, PT [facility]   7/7/2021  9:30 AM Ezequiel Miguel, PT [facility]   7/8/2021 10:15 AM [facility], PT [facility]     Visit Approval Visit # Therapist initials Date A NS / Cx < 24 hr >24 hr Cx Comments    1 Arun  6/8/21 [x]  [] [] Initial evaluation    2 GP/Arun 6/14/21 [x] [] []     3 GP/Arun 6/17/21 [x] [] []     4 GP/Arun 6/21/21 [x] [] []     5 GP/Arun 6/24/21 [x] [] []     6 GP/Arun 6/28/21 [x] [] []        [] [] []        [] [] []        [] [] []        [] [] []        [] [] []        [] [] []        [] [] []        [] [] []        [] [] []        [] [] []        [] [] []        [] [] []

## 2021-07-01 ENCOUNTER — HOSPITAL ENCOUNTER (OUTPATIENT)
Dept: PHYSICAL THERAPY | Age: 81
Discharge: HOME | End: 2021-07-01
Payer: MEDICARE

## 2021-07-01 PROCEDURE — 97016 VASOPNEUMATIC DEVICE THERAPY: CPT

## 2021-07-01 PROCEDURE — 97110 THERAPEUTIC EXERCISES: CPT

## 2021-07-01 PROCEDURE — 97140 MANUAL THERAPY 1/> REGIONS: CPT

## 2021-07-01 NOTE — PROGRESS NOTES
Sagrario Joseph  : 1940  [place]  Secondary:  [address]  at [address] [facility] [address], [address], [place], [facility]  [phone]   [phone]        OUTPATIENT PHYSICAL THERAPY: Daily Treatment Note 2021  Visit Count:  7   ICD-10: Treatment Diagnosis: Pain in right shoulder [M25.511]  Other chronic pain [G89.29]  Precautions/Allergies:   Patient has no allergy information on record. TREATMENT PLAN:  Effective Dates: 2021 TO 2021 (90 days). Frequency/Duration: Up to 3 times a week for 90 Day(s)  Pain in right shoulder [M25.511]  Other chronic pain [G89.29]  MEDICAL/REFERRING DIAGNOSIS:  Pain in right shoulder [M25.511]  Other chronic pain [G89.29]   DATE OF ONSET: Acute on Chronic 2021  REFERRING PHYSICIAN: Maliha Aldrich MD  Pre-treatment Symptoms/Complaints:  Pt states that the pain has subsided for the majority of this past weekend. Pt states pain has decreased overall and now can achieve more RUE AROM w/o the presence of a painful arch. Pt still notes that HEP RUE isometrics still cause some minor irritation. Pt states that with an increased workload on her RUE has caused some minor irritation     Future Appointments   Date Time Provider Reginald Corrigan   2021  9:30 AM Danish Gonzalez PT [facility]   2021 10:15 AM Danish Gonzalez, PT [facility]       Pain: Initial:  4/10 right shoulder and posterior scapular region still present, reduction in painful RUE arc has been shown via RUE AROM observation. Pt states that pain was from working outside for longer periods of time might of caused some soreness   Post Session:  3/10 right shoulder and posterior scapular region. 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      TREATMENT: THERAPEUTIC ACTIVITY: ( 0 minutes): Therapeutic activities per grid below to improve mobility, strength, balance and dynamic movement of shoulder - right to improve functional lifting, carrying, reaching and overhead activites. Required no visual, verbal, manual and tactile cues to promote static and dynamic balance in standing, promote coordination of right, upper extremity(s) and promote motor control of right, upper extremity(s). THERAPEUTIC EXERCISE: (45 minutes):  Exercises per grid below to improve mobility, strength, balance, coordination and dynamic movement of shoulder - right to improve functional carrying, reaching, catching and overhead activites. Required no visual, verbal, manual and tactile cues to promote proper body alignment, promote proper body posture and promote proper body mechanics. Progressed resistance, range, repetitions and complexity of movement as indicated. Slow performance with additional reflection with block practice. NEUROMUSCULAR RE-EDUCATION: (0 minutes):  Exercise/activities per grid below to improve coordination, posture and proprioception. Required no visual, verbal, manual and tactile cues to promote static and dynamic balance in standing, promote coordination of right, upper extremity(s) and promote motor control of right, upper extremity(s). MANUAL THERAPY: (8 minutes): Joint mobilization, Soft tissue mobilization and Manipulation was utilized and necessary because of the patient's restricted joint motion, painful spasm, loss of articular motion and restricted motion of soft tissue. MODALITIES: (15 minutes):      Vasopneumatic Compression Cold 15 min intermittent moderate compression    1 unit  pt is seated, compression sleeve covered posterior/anterior/lateral aspects of the RUE, intermittent moderate compression at 44 degrees. Pt used a pillow for RUE support into horizontal ABD/FLX/IR of the RUE when receiving cryotherapy.       Date:  7/1/21 MANUAL 8 mins           Manual supine 90/90 PROM R shldr ER/IR  4 mins           Manual supine  Posterior/Inferior glides grade 2-3 force MOBs R Shldr  4 mins            Activity/Exercise 45 mins           UBE  8 mins 4 forward and 4    backward level 4           Wall Roly's 2 x 20 8 mins           Standing Y's Dumbbell 3x10 3#          Standing T's Dumbbell 3x10 1#          Standing band pull aparts open can  3x10 red theraband           Isometric  held          Seated Scapular retraction  held          Access Code: [email or web address]  URL: [email or web address]  Date: 06/09/2021  Prepared by: Arun Johanne    Exercises  Isometric Shoulder Flexion - 2 x daily - 7 x weekly - 10 reps - 1 sets - 5 hold  Isometric Shoulder External Rotation - 2 x daily - 7 x weekly - 10 reps - 1 sets - 5 hold  Isometric Shoulder Internal Rotation - 2 x daily - 7 x weekly - 10 reps - 1 sets - 5 hold  Isometric Shoulder Adduction - 2 x daily - 7 x weekly - 10 reps - 1 sets - 5 hold  Isometric Shoulder Abduction at Wall - 2 x daily - 7 x weekly - 10 reps - 1 sets - 5 hold  Isometric Shoulder Extension at Wall - 2 x daily - 7 x weekly - 10 reps - 1 sets - 5 hold  Standing Scapular Retraction - 2 x daily - 7 x weekly - 10 reps - 1 sets - 5 hold    Patient Education  Ice  Shoulder Impingement    [facility] Portal  Treatment/Session Summary:    · Response to Treatment:  Pt exhibited slight pain when assessing ROM. When performing manual interventions to the RUE, the pt received more AROM w/o pain. Pt's s/s in the RUE have decreased. ·  Communication/Consultation:  HEP program  Given to patient. · Equipment provided today:  None today  · Recommendations/Intent for next treatment session: Next visit will focus on dynamic movement and stability on the R shldr. Dynamic there-ex tx will be mostly guided on the lvl of irritability and pain.  Once pt can achieve an optimal lvl ROM and comfort, the pt will begin to progress to a more dynamic stability exercise program. Pt will progress PRN at this moment in time in time. Pt has made some available space in the subacromial region however the pt's RUE shows some residual soreness from working outside over this past week. The pt's ability to gain strength and stability is evident but would still need skilled therapy to address the pt's remaining deficits.   Will continue to monitor s/s      Total Treatment Billable Duration:  53 minutes times and [address]    PT Patient Time In/Time Out  Time In: 9159  Time Out: [address] [place], [place]    Future Appointments   Date Time Provider Reginald Corrigan   7/7/2021  9:30 AM Amada Alvarado, PT [facility] [place]   7/8/2021 10:15 AM Amada Alvarado, PT [facility]     Visit Approval Visit # Therapist initials Date A NS / Cx < 24 hr >24 hr Cx Comments    1 Arun  6/8/21 [x]  [] [] Initial evaluation    2 GP/Arun 6/14/21 [x] [] []     3 GP/Arun 6/17/21 [x] [] []     4 GP/Arun 6/21/21 [x] [] []     5 GP/Arun 6/24/21 [x] [] []     6 GP/Arun 6/28/21 [x] [] []     7 GP/Arun 7/1/21 [x] [] []        [] [x] []        [] [] []        [] [] []        [] [] []        [] [] []        [] [] []        [] [] []        [] [] []        [] [] []        [] [] []        [] [] []

## 2021-07-07 ENCOUNTER — HOSPITAL ENCOUNTER (OUTPATIENT)
Dept: PHYSICAL THERAPY | Age: 81
Discharge: HOME | End: 2021-07-07
Payer: MEDICARE

## 2021-07-07 PROCEDURE — 97140 MANUAL THERAPY 1/> REGIONS: CPT

## 2021-07-07 PROCEDURE — 97110 THERAPEUTIC EXERCISES: CPT

## 2021-07-07 PROCEDURE — 97016 VASOPNEUMATIC DEVICE THERAPY: CPT

## 2021-07-07 NOTE — PROGRESS NOTES
Victoriano Freitas  : 1940  [place]  Secondary:  [address]  at [facility]  [address] [facility] [place], [address]  [phone]   [phone]        OUTPATIENT PHYSICAL THERAPY: Daily Treatment Note 2021  Visit Count:  8   ICD-10: Treatment Diagnosis: Pain in right shoulder [M25.511]  Other chronic pain [G89.29]  Precautions/Allergies:   Patient has no allergy information on record. TREATMENT PLAN:  Effective Dates: 2021 TO 2021 (90 days). Frequency/Duration: Up to 3 times a week for 90 Day(s)  Pain in right shoulder [M25.511]  Other chronic pain [G89.29]  MEDICAL/REFERRING DIAGNOSIS:  Pain in right shoulder [M25.511]  Other chronic pain [G89.29]   DATE OF ONSET: Acute on Chronic 2021  REFERRING PHYSICIAN: Conner Cruz MD  Pre-treatment Symptoms/Complaints:  Pt states that the pain has subsided for the majority of this past weekend. Pt states pain has decreased overall and now can achieve more RUE AROM w/o the presence of a painful arch. Pt still notes that HEP RUE isometrics still cause some minor irritation. Pt states that with an increased workload on her RUE has caused some minor irritation. Pt exhibited a small and minor flare up, pt states that it could of been a cold/sinus inflammatory issue. Future Appointments   Date Time Provider Reginald Corrigan   2021  7:00 PM Hank Bhagat [facility]   2021  7:30 AM Velma Vega PT [facility]       Pain: Initial:  4/10 right shoulder and posterior scapular region still present, reduction in painful RUE arc has been shown via RUE AROM observation. Pt states that pain was from working outside for longer periods of time might of caused some soreness   Post Session:  3/10 right shoulder and posterior scapular region.  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 TREATMENT:     THERAPEUTIC ACTIVITY: ( 0 minutes): Therapeutic activities per grid below to improve mobility, strength, balance and dynamic movement of shoulder - right to improve functional lifting, carrying, reaching and overhead activites. Required no visual, verbal, manual and tactile cues to promote static and dynamic balance in standing, promote coordination of right, upper extremity(s) and promote motor control of right, upper extremity(s). THERAPEUTIC EXERCISE: (45 minutes):  Exercises per grid below to improve mobility, strength, balance, coordination and dynamic movement of shoulder - right to improve functional carrying, reaching, catching and overhead activites. Required no visual, verbal, manual and tactile cues to promote proper body alignment, promote proper body posture and promote proper body mechanics. Progressed resistance, range, repetitions and complexity of movement as indicated. Slow performance with additional reflection with block practice. NEUROMUSCULAR RE-EDUCATION: (0 minutes):  Exercise/activities per grid below to improve coordination, posture and proprioception. Required no visual, verbal, manual and tactile cues to promote static and dynamic balance in standing, promote coordination of right, upper extremity(s) and promote motor control of right, upper extremity(s). MANUAL THERAPY: (8 minutes): Joint mobilization, Soft tissue mobilization and Manipulation was utilized and necessary because of the patient's restricted joint motion, painful spasm, loss of articular motion and restricted motion of soft tissue. MODALITIES: (15 minutes):      Vasopneumatic Compression Cold 15 min intermittent moderate compression    1 unit  pt is seated, compression sleeve covered posterior/anterior/lateral aspects of the RUE, intermittent moderate compression at 44 degrees.  Pt used a pillow for RUE support into horizontal ABD/FLX/IR of the RUE when receiving cryotherapy. Date:  7/7/21             MANUAL 8 mins           Manual supine 90/90 PROM R shldr ER/IR  4 mins           Manual supine  Posterior/Inferior glides grade 2-3 force MOBs R Shldr  4 mins            Activity/Exercise 45 mins           UBE  8 mins 4 forward and 4    backward level 4           Wall Roly's 2 x 20 8 mins           Standing Y's Dumbbell 3x10 3#          Standing T's Dumbbell 3x10 1#          Standing band pull aparts open can  3x10 red theraband           Isometric  held          Seated Scapular retraction  held          Access Code: [email or web address]  URL: [email or web address]  Date: 06/09/2021  Prepared by: Arun Johanne    Exercises  Isometric Shoulder Flexion - 2 x daily - 7 x weekly - 10 reps - 1 sets - 5 hold  Isometric Shoulder External Rotation - 2 x daily - 7 x weekly - 10 reps - 1 sets - 5 hold  Isometric Shoulder Internal Rotation - 2 x daily - 7 x weekly - 10 reps - 1 sets - 5 hold  Isometric Shoulder Adduction - 2 x daily - 7 x weekly - 10 reps - 1 sets - 5 hold  Isometric Shoulder Abduction at Wall - 2 x daily - 7 x weekly - 10 reps - 1 sets - 5 hold  Isometric Shoulder Extension at Wall - 2 x daily - 7 x weekly - 10 reps - 1 sets - 5 hold  Standing Scapular Retraction - 2 x daily - 7 x weekly - 10 reps - 1 sets - 5 hold    Patient Education  Ice  Shoulder Impingement    [facility] Portal  Treatment/Session Summary:    · Response to Treatment:  Pt exhibited slight pain when assessing ROM. When performing manual interventions to the RUE, the pt received more AROM w/o pain. Pt's s/s in the RUE have decreased. Pt noticed a reduce in overall RUE s/s. ·  Communication/Consultation:  HEP program  Given to patient.       · Equipment provided today:  None today  · Recommendations/Intent for next treatment session: Next visit will focus on dynamic movement and stability on the R shldr. Dynamic there-ex tx will be mostly guided on the lvl of irritability and pain. Pt will progress PRN at this moment in time in time. Pt has made some available space in the subacromial region however the pt's RUE shows some residual soreness from working outside over this past week. The pt's ability to gain strength and stability is evident but would still need skilled therapy to address the pt's remaining deficits.   Will continue to monitor s/s      Total Treatment Billable Duration:  53 minutes times and [address], PT    Future Appointments   Date Time Provider Reginald Corrigan   7/8/2021  7:00 PM Randall Hines PT [facility]   7/12/2021  7:30 AM Randall Hines PT [facility]     Visit Approval Visit # Therapist initials Date A NS / Cx < 24 hr >24 hr Cx Comments    1 Arun  6/8/21 [x]  [] [] Initial evaluation    2 GP/Arun 6/14/21 [x] [] []     3 GP/Arun 6/17/21 [x] [] []     4 GP/Arun 6/21/21 [x] [] []     5 GP/Arun 6/24/21 [x] [] []     6 GP/Arun 6/28/21 [x] [] []     7 GP/Arun 7/1/21 [x] [] []        [] [x] [] Canceled     8  GP/Arun 7/7/21 [] [] []        [] [] []        [] [] []        [] [] []        [] [] []        [] [] []        [] [] []        [] [] []        [] [] []        [] [] []

## 2021-07-08 ENCOUNTER — APPOINTMENT (OUTPATIENT)
Dept: PHYSICAL THERAPY | Age: 81
End: 2021-07-08
Payer: MEDICARE

## 2021-07-12 ENCOUNTER — HOSPITAL ENCOUNTER (OUTPATIENT)
Dept: PHYSICAL THERAPY | Age: 81
Discharge: HOME | End: 2021-07-12
Payer: MEDICARE

## 2021-07-12 PROCEDURE — 97140 MANUAL THERAPY 1/> REGIONS: CPT

## 2021-07-12 PROCEDURE — 97016 VASOPNEUMATIC DEVICE THERAPY: CPT

## 2021-07-12 PROCEDURE — 97110 THERAPEUTIC EXERCISES: CPT

## 2021-07-12 NOTE — PROGRESS NOTES
Lorena Shirley  : 1940  [place]  Secondary:  [address]  at [facility]  [address], [address] [facility], [address]  [phone]   [phone]        OUTPATIENT PHYSICAL THERAPY: Daily Treatment Note 2021  Visit Count:  9     ICD-10: Treatment Diagnosis: Pain in right shoulder [M25.511]  Other chronic pain [G89.29]  Precautions/Allergies:   Patient has no allergy information on record. TREATMENT PLAN:  Effective Dates: 2021 TO 2021 (90 days). Frequency/Duration: Up to 3 times a week for 90 Day(s)  Pain in right shoulder [M25.511]  Other chronic pain [G89.29]  MEDICAL/REFERRING DIAGNOSIS:  Pain in right shoulder [M25.511]  Other chronic pain [G89.29]   DATE OF ONSET: Acute on Chronic 2021  REFERRING PHYSICIAN: Suzzette Boeck, MD  Pre-treatment Symptoms/Complaints:  Pt states that the pain has subsided for the majority of this past weekend. Pt states pain has decreased overall and now can achieve more RUE AROM w/o the presence of a painful arch. Pt reports no increased pain over the course of the weekend. Pt states that the majority of movement is pain free. Future Appointments   Date Time Provider Reginald Corrigan   7/15/2021 10:15 AM Dario Hsu, PT [facility]   2021 10:15 AM Viclakisha Hsu, PT [facility]   2021 10:15 AM Vickii Mor, PT [facility]   2021 10:15 AM Vickii Mor, PT [facility]       Pain: Initial:  4/10 right shoulder and posterior scapular region still present, reduction in painful RUE arc has been shown via RUE AROM observation. Pt states that with increasing workload the RUE is showing no signs of increased pain. Post Session:  3/10 right shoulder and posterior scapular region.  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 TREATMENT:     THERAPEUTIC ACTIVITY: ( 0 minutes): Therapeutic activities per grid below to improve mobility, strength, balance and dynamic movement of shoulder - right to improve functional lifting, carrying, reaching and overhead activites. Required no visual, verbal, manual and tactile cues to promote static and dynamic balance in standing, promote coordination of right, upper extremity(s) and promote motor control of right, upper extremity(s). THERAPEUTIC EXERCISE: (45 minutes):  Exercises per grid below to improve mobility, strength, balance, coordination and dynamic movement of shoulder - right to improve functional carrying, reaching, catching and overhead activites. Required no visual, verbal, manual and tactile cues to promote proper body alignment, promote proper body posture and promote proper body mechanics. Progressed resistance, range, repetitions and complexity of movement as indicated. Slow performance with additional reflection with block practice. NEUROMUSCULAR RE-EDUCATION: (0 minutes):  Exercise/activities per grid below to improve coordination, posture and proprioception. Required no visual, verbal, manual and tactile cues to promote static and dynamic balance in standing, promote coordination of right, upper extremity(s) and promote motor control of right, upper extremity(s). MANUAL THERAPY: (8 minutes): Joint mobilization, Soft tissue mobilization and Manipulation was utilized and necessary because of the patient's restricted joint motion, painful spasm, loss of articular motion and restricted motion of soft tissue. MODALITIES: (15 minutes):      Vasopneumatic Compression Cold 15 min intermittent moderate compression    1 unit  pt is seated, compression sleeve covered posterior/anterior/lateral aspects of the RUE, intermittent moderate compression at 44 degrees.  Pt used a pillow for RUE support into horizontal ABD/FLX/IR of the RUE when receiving cryotherapy. Date:  7/12/21             MANUAL 8 mins           Manual supine 90/90 PROM R shldr ER/IR  4 mins           Manual supine  Posterior/Inferior glides grade 2-3 force MOBs R Shldr  4 mins            Activity/Exercise 45 mins           UBE  8 mins 4 forward and 4    backward level 4           Wall Roly's 2 x 20 8 mins           Standing Y's Dumbbell 3x10 3#          Standing T's Dumbbell 3x10 1#          Standing band pull aparts open can  3x10 red theraband           Isometric  held          Seated Scapular retraction  held          Access Code: [email or web address]  URL: [email or web address]  Date: 06/09/2021  Prepared by: Arun [place]    Exercises  Isometric Shoulder Flexion - 2 x daily - 7 x weekly - 10 reps - 1 sets - 5 hold  Isometric Shoulder External Rotation - 2 x daily - 7 x weekly - 10 reps - 1 sets - 5 hold  Isometric Shoulder Internal Rotation - 2 x daily - 7 x weekly - 10 reps - 1 sets - 5 hold  Isometric Shoulder Adduction - 2 x daily - 7 x weekly - 10 reps - 1 sets - 5 hold  Isometric Shoulder Abduction at Wall - 2 x daily - 7 x weekly - 10 reps - 1 sets - 5 hold  Isometric Shoulder Extension at Wall - 2 x daily - 7 x weekly - 10 reps - 1 sets - 5 hold  Standing Scapular Retraction - 2 x daily - 7 x weekly - 10 reps - 1 sets - 5 hold    Patient Education  Ice  Shoulder Impingement    [facility] Portal  Treatment/Session Summary:    · Response to Treatment:  Pt exhibited slight pain when assessing ROM. When performing manual interventions to the RUE, the pt received more AROM w/o pain. Pt's s/s in the RUE have decreased. Pt noticed a reduce in overall RUE s/s. ·  Communication/Consultation:  HEP program  Given to patient. · Equipment provided today:  None today  · Recommendations/Intent for next treatment session: Next visit will focus on dynamic movement and stability on the R shldr. Dynamic there-ex tx will be mostly guided on the lvl of irritability and pain. Pt will progress PRN at this moment in time in time. Pt has made some available space in the subacromial region however the pt's RUE shows some residual soreness from working outside over this past week. The pt's ability to gain strength and stability is evident but would still need skilled therapy to address the pt's remaining deficits.   Will continue to monitor s/s      Total Treatment Billable Duration:  53 minutes times and [address]    PT Patient Time In/Time Out  Time In: 0730  Time Out: Gerard Rainey, PT    Future Appointments   Date Time Provider Reginald Corrigan   7/15/2021 10:15 AM Benjamen Brink, PT [facility]   7/19/2021 10:15 AM Benjamen Brink, PT [facility]   7/22/2021 10:15 AM Benjamen Brink, PT [facility]   7/26/2021 10:15 AM Benjamen Brink, PT [facility]     Visit Approval Visit # Therapist initials Date A NS / Cx < 24 hr >24 hr Cx Comments    1 Arun  6/8/21 [x]  [] [] Initial evaluation    2 GP/Arun 6/14/21 [x] [] []     3 GP/Arun 6/17/21 [x] [] []     4 GP/Arun 6/21/21 [x] [] []     5 GP/Arun 6/24/21 [x] [] []     6 GP/Arun 6/28/21 [x] [] []     7 GP/Arun 7/1/21 [x] [] []        [] [x] [] Canceled     8  GP/Arun 7/7/21 [] [] []     9 GP/Arun 7/12/21 [x] [] []        [] [] []        [] [] []        [] [] []        [] [] []        [] [] []        [] [] []        [] [] []        [] [] []

## 2021-07-15 ENCOUNTER — HOSPITAL ENCOUNTER (OUTPATIENT)
Dept: PHYSICAL THERAPY | Age: 81
Discharge: HOME | End: 2021-07-15
Payer: MEDICARE

## 2021-07-15 PROCEDURE — 97140 MANUAL THERAPY 1/> REGIONS: CPT

## 2021-07-15 PROCEDURE — 97110 THERAPEUTIC EXERCISES: CPT

## 2021-07-15 PROCEDURE — 97016 VASOPNEUMATIC DEVICE THERAPY: CPT

## 2021-07-15 NOTE — PROGRESS NOTES
Damaris Bello  : 1940  [place]  Secondary:  [address]  at [facility]  [address], [address] [facility], [place] [address] [address] [facility]  [phone]   [phone]        OUTPATIENT PHYSICAL THERAPY: Daily Treatment Note 7/15/2021  Visit Count:  10     ICD-10: Treatment Diagnosis: Pain in right shoulder [M25.511]  Other chronic pain [G89.29]  Precautions/Allergies:   Patient has no allergy information on record. TREATMENT PLAN:  Effective Dates: 2021 TO 2021 (90 days). Frequency/Duration: Up to 3 times a week for 90 Day(s)  Pain in right shoulder [M25.511]  Other chronic pain [G89.29]  MEDICAL/REFERRING DIAGNOSIS:  Pain in right shoulder [M25.511]  Other chronic pain [G89.29]   DATE OF ONSET: Acute on Chronic 2021  REFERRING PHYSICIAN: Tiffany Lamb MD  Pre-treatment Symptoms/Complaints:  Pt states that the pain has subsided for the majority of this past weekend. Pt states pain has decreased overall and now can achieve more RUE AROM w/o the presence of a painful arch. Pt reports no increased pain over the course of the weekend. Pt states that the majority of movement is pain free. Pt states that the frequency in RUE shoulder pain has reduced significantly. Future Appointments   Date Time Provider Reginald Corrigan   7/15/2021 10:15 AM Orest West, PT [facility]   2021 10:15 AM Orest West, PT [facility]   2021 10:15 AM Orest West, PT [facility]   2021 10:15 AM Orest West, PT [facility]       Pain: Initial:  3/10 right shoulder and posterior scapular region still present, reduction in painful RUE arc has been shown via RUE AROM observation. Pt states that with increasing workload the RUE is showing no signs of increased pain. Post Session:  2/10 right shoulder and posterior scapular region.  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 TREATMENT:     THERAPEUTIC ACTIVITY: ( 0 minutes): Therapeutic activities per grid below to improve mobility, strength, balance and dynamic movement of shoulder - right to improve functional lifting, carrying, reaching and overhead activites. Required no visual, verbal, manual and tactile cues to promote static and dynamic balance in standing, promote coordination of right, upper extremity(s) and promote motor control of right, upper extremity(s). THERAPEUTIC EXERCISE: (45 minutes):  Exercises per grid below to improve mobility, strength, balance, coordination and dynamic movement of shoulder - right to improve functional carrying, reaching, catching and overhead activites. Required no visual, verbal, manual and tactile cues to promote proper body alignment, promote proper body posture and promote proper body mechanics. Progressed resistance, range, repetitions and complexity of movement as indicated. Slow performance with additional reflection with block practice. NEUROMUSCULAR RE-EDUCATION: (0 minutes):  Exercise/activities per grid below to improve coordination, posture and proprioception. Required no visual, verbal, manual and tactile cues to promote static and dynamic balance in standing, promote coordination of right, upper extremity(s) and promote motor control of right, upper extremity(s). MANUAL THERAPY: (8 minutes): Joint mobilization, Soft tissue mobilization and Manipulation was utilized and necessary because of the patient's restricted joint motion, painful spasm, loss of articular motion and restricted motion of soft tissue.    MODALITIES: (15 minutes):      Vasopneumatic Compression Cold 15 min intermittent moderate compression    1 unit  pt is seated, compression sleeve covered posterior/anterior/lateral aspects of the RUE, intermittent moderate compression at 44 degrees. Pt used a pillow for RUE support into horizontal ABD/FLX/IR of the RUE when receiving cryotherapy. Date:  7/15/21             MANUAL 8 mins           Manual supine 90/90 PROM R shldr ER/IR  4 mins           Manual supine  Posterior/Inferior glides grade 2-3 force MOBs R Shldr  4 mins            Activity/Exercise 45 mins           UBE  8 mins 4 forward and 4    backward level 4           Wall Roly's 2 x 20 8 mins           Standing Y's Dumbbell 3x10 3#          Standing T's Dumbbell 3x10 1#          Standing band pull aparts open can  3x10 red theraband           Isometric  held          Seated Scapular retraction  held          Access Code: [email or web address]  URL: [email or web address]  Date: 06/09/2021  Prepared by: Arun Johanne    Exercises  Isometric Shoulder Flexion - 2 x daily - 7 x weekly - 10 reps - 1 sets - 5 hold  Isometric Shoulder External Rotation - 2 x daily - 7 x weekly - 10 reps - 1 sets - 5 hold  Isometric Shoulder Internal Rotation - 2 x daily - 7 x weekly - 10 reps - 1 sets - 5 hold  Isometric Shoulder Adduction - 2 x daily - 7 x weekly - 10 reps - 1 sets - 5 hold  Isometric Shoulder Abduction at Wall - 2 x daily - 7 x weekly - 10 reps - 1 sets - 5 hold  Isometric Shoulder Extension at Wall - 2 x daily - 7 x weekly - 10 reps - 1 sets - 5 hold  Standing Scapular Retraction - 2 x daily - 7 x weekly - 10 reps - 1 sets - 5 hold    Patient Education  Ice  Shoulder Impingement    [facility] Portal  Treatment/Session Summary:    · Response to Treatment:  Pt exhibited slight pain when assessing ROM. When performing manual interventions to the RUE, the pt received more AROM w/o pain. Pt's s/s in the RUE have decreased. Pt noticed a reduce in overall RUE s/s. Pt is progressing into more dynamic RUE shoulder stability exercises   ·  Communication/Consultation:  HEP program  Given to patient.       · Equipment provided today:  None today  · Recommendations/Intent for next treatment session: Next visit will focus on dynamic movement and stability on the R shldr. Dynamic there-ex tx will be mostly guided on the lvl of irritability and pain. Pt will progress PRN at this moment in time in time. Pt has made some available space in the subacromial region however the pt's RUE shows some residual soreness from working outside over this past week. The pt's ability to gain strength and stability is evident but would still need skilled therapy to address the pt's remaining deficits. Pt is progressing to more dynamic stability exercises PRN.   Will continue to monitor s/s      Total Treatment Billable Duration:  53 minutes times and [facility]    PT Patient Time In/Time Out  Time In: [address], SPT    Future Appointments   Date Time Provider Reginald Corrigan   7/15/2021 10:15 AM Talita Greenberg, PT [facility]   7/19/2021 10:15 AM Talita Greenberg, PT [facility]   7/22/2021 10:15 AM Talita Greenberg, PT [facility]   7/26/2021 10:15 AM Roseannaa Dionicio, PT [facility]     Visit Approval Visit # Therapist initials Date A NS / Cx < 24 hr >24 hr Cx Comments    1 Arun  6/8/21 [x]  [] [] Initial evaluation    2 GP/Arun 6/14/21 [x] [] []     3 GP/Arun 6/17/21 [x] [] []     4 GP/Arun 6/21/21 [x] [] []     5 GP/Arun 6/24/21 [x] [] []     6 GP/Arun 6/28/21 [x] [] []     7 GP/Arun 7/1/21 [x] [] []        [] [x] [] Canceled     8  GP/Arun 7/7/21 [x] [] []     9 GP/Arun 7/12/21 [x] [] []     10 GP/Arun 7/15/21 [x] [] [] Progress Note        [] [] []        [] [] []        [] [] []        [] [] []        [] [] []        [] [] []        [] [] []

## 2021-07-15 NOTE — THERAPY RECERTIFICATION
[id number]}  Jackson Jackman  : 1940  [place]  Secondary:  [address]  [facility] [address] [facility], [place] [address] [address] [facility]  [phone]   [phone]        OUTPATIENT PHYSICAL THERAPY:Recertification    ICD-10: Treatment Diagnosis: Pain in right shoulder [M25.511]  Other chronic pain [G89.29]  Precautions/Allergies:   Patient has no allergy information on record. TREATMENT PLAN:  Effective Dates: 2021 TO 2021 (90 days). Frequency/Duration: Up to 3 times a week for 90 Day(s) MEDICAL/REFERRING DIAGNOSIS:  Pain in right shoulder [M25.511]  Other chronic pain [G89.29]   DATE OF ONSET: Acute on Chronic 2021  REFERRING PHYSICIAN: Joo Amor MD MD Orders: PT eval & treat  Return MD Appointment:   Future Appointments   Date Time Provider Reginald Corrigan   2021 10:15 AM Lazarus Schultz, PT [facility]   2021 10:15 AM Lazarus Schultz, PT [facility]   2021 10:15 AM Lazarus Schultz, PT [facility]        INITIAL ASSESSMENT:  Ms. Sandeep Cote presents w/ R shoulder impingement secondary to scapulothoracic dysfunction w/ supraspinatus TTP modified Hagen Preet's test.  DASH is 17/55 for this patient. Exquisite tenderness right supraspinatus tendon and trigger point right infraspinatus and teres minor. Skilled PT is indicated for this patient's functional mobility deficits. Pt is on progress note today 7/15/2021 and is making substantial progress towards the pt's discharge goals. Pt has achieved 2/3 goals and the last goal is partially met and is making progress towards completion. Pt states that the RUE pain is substantially getting better w/ sleep disturbances decreasing as well as pain. Pt states now that she can start working in her garden longer w/o flare ups. Pt's ROM and strength via observation and completion of higher complexity exercises are evident.  Pt would benefit to continue w/ skilled PT in order to address the remaining goals. PROBLEM LIST (Impacting functional limitations):  1. Decreased Strength  2. Decreased ADL/Functional Activities  3. Increased Pain  4. Decreased Flexibility/Joint Mobility INTERVENTIONS PLANNED: (Treatment may consist of any combination of the following)  1. Continuous Passive Motion (CPM)  2. Cryotherapy  3. Home Exercise Program (HEP)  4. Manual Therapy  5. Neuromuscular Re-education/Strengthening  6. Range of Motion (ROM)  7. Therapeutic Activites  8. Therapeutic Exercise/Strengthening  9. Transcutaneous Electrical Nerve Stimulation (TENS)  10. Vasopneumatic compression      GOALS: (Goals have been discussed and agreed upon with patient.) 2/3 discharge goals MET, remaining are partially met and are making progress to completion   Short-Term Functional Goals: Time Frame: 6 weeks   1. Pt will improve R shoulder ER to 80 degrees ROM  to be able to participate in daily household activities MET  2. Pt will decrease pain to 3/10 pain to be able to part take in daily household activities MET  3. Pt will improve R shoulder IR to 60 degrees ROM to be able to dress herself regularly MET  Discharge Goals: Time Frame: 12 weeks   5. Pt will improve R shoulder ER to 90 degrees ROM  to be able to participate in daily household activities MET  6. Pt will decrease pain to 0/10 pain to be able to part take in daily household activities Partially MET  7. Pt will improve R shoulder IR to 90 degrees ROM to be able to dress herself regularly. Scratch L1/T1 MET    OUTCOME MEASURE:   Tool Used: Disabilities of the Arm, Shoulder and Hand (DASH) Questionnaire - Quick Version  Score:  Initial: 32/55  Most Recent:25/55 (Date: 7/15/21 )   Interpretation of Score: The DASH is designed to measure the activities of daily living in person's with upper extremity dysfunction or pain. Each section is scored on a 1-5 scale, 5 representing the greatest disability.   The scores of each section are added together for a total score of 55. MEDICAL NECESSITY:   · Patient is expected to demonstrate progress in strength, range of motion and functional technique to increase independence with Dressing, bathing and performing ADLs. REASON FOR SERVICES/OTHER COMMENTS:  · Patient has demonstrated an improvement in functional level by increasing ROM w/ isometric exercises . Total Duration:  PT Patient Time In/Time Out  Time In: 1005    Rehabilitation Potential For Stated Goals: Excellent  Regarding Mohsen Velasquez's therapy, I certify that the treatment plan above will be carried out by a therapist or under their direction. Thank you for this referral,  THERESA Cabrera     Referring Physician Signature: Judy Burns MD No Signature is Required for this note. PAIN/SUBJECTIVE:   Initial:   2/10  Right shoulder  Post Session:  1/10 right shoulder. HISTORY:   History of Injury/Illness (Reason for Referral):  Acute on chronic exacerbation of the R shoulder when lifting objects, and dressing when using the R shldr   Past Medical History/Comorbidities:   Ms. Tatiana Em  has no past medical history on file. Ms. Tatiana Em  has no past surgical history on file. 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  Federal Law prohibits the replication, distribution or use without written permission from [facility] [email or web address]   Current Medications:     No current outpatient medications on file. Date Last Reviewed:  7/15/2021   Number of Personal Factors/Comorbidities that affect the Plan of Care: 0: LOW COMPLEXITY   EXAMINATION:   Palpation:    TTP supraspinatus insertion tenderness   TTP infraspinatus   TTP levator scapula/upper trap/scalenes         ROM:                                                             Right                              AROM   STRENGTH   PROM  NORMALS  Flexion                    WNL           5           WNL            160  ABDUCTION          WNL           5           WNL            160  Extension               WNL           5           WNL             60  ER                             WNL           4+         WNL             85   IR                              WNL          4+           WNL            T12     Strength:      See above  Special Tests:          Neers (-)   Empty can (-)   Modified Coral Messenger (+) TTP supraspinatus insertion tenderness     Neurological Screen:        Myotomes: WNL        Dermatomes: WNL        Sensation: WNL  Functional Mobility:         Hand behind back limited R>L pain provocation indicated   Coordination:          N/A   Activities of Daily Living:         Reaching   Hand behind back   Arm overhead   Dressing   Driving   Sleeping behaviors lying on R side   Basic ADLs (From Assessment) Complex ADLs (From Assessment)         Grooming/Bathing/Dressing Activities of Daily Living                                    Body Structures Involved:  1. Thoracic Cage  2. Bones  3. Joints  4. Muscles  5. Ligaments Body Functions Affected:  1. Sensory/Pain  2. Neuromusculoskeletal  3. Movement Related Activities and Participation Affected:  1. Mobility  2.  Self Care   Number of elements (examined above) that affect the Plan of Care: 1-2: LOW COMPLEXITY   CLINICAL PRESENTATION:   Presentation: Stable and uncomplicated: LOW COMPLEXITY   CLINICAL DECISION MAKING:   Use of outcome tool(s) and clinical judgement create a POC that gives a: Clear prediction of patient's progress: LOW COMPLEXITY

## 2021-07-19 ENCOUNTER — HOSPITAL ENCOUNTER (OUTPATIENT)
Dept: PHYSICAL THERAPY | Age: 81
Discharge: HOME | End: 2021-07-19
Payer: MEDICARE

## 2021-07-19 PROCEDURE — 97110 THERAPEUTIC EXERCISES: CPT

## 2021-07-19 PROCEDURE — 97140 MANUAL THERAPY 1/> REGIONS: CPT

## 2021-07-19 PROCEDURE — 97016 VASOPNEUMATIC DEVICE THERAPY: CPT

## 2021-07-19 NOTE — PROGRESS NOTES
Lorena Shirley  : 1940  [place]  Secondary:  [address]  [facility], [address]  [phone]   [phone]        OUTPATIENT PHYSICAL THERAPY: Daily Treatment Note 2021  Visit Count:  11     ICD-10: Treatment Diagnosis: Pain in right shoulder [M25.511]  Other chronic pain [G89.29]  Precautions/Allergies:   Patient has no allergy information on record. TREATMENT PLAN:  Effective Dates: 2021 TO 2021 (90 days). Frequency/Duration: Up to 3 times a week for 90 Day(s)  Pain in right shoulder [M25.511]  Other chronic pain [G89.29]  MEDICAL/REFERRING DIAGNOSIS:  Pain in right shoulder [M25.511]  Other chronic pain [G89.29]   DATE OF ONSET: Acute on Chronic 2021  REFERRING PHYSICIAN: Suzzette Boeck, MD  Pre-treatment Symptoms/Complaints:  Pt states that the pain has subsided for the majority of this past weekend. Pt states pain has decreased overall and now can achieve more RUE AROM w/o the presence of a painful arch. Pt reports no increased pain over the course of the weekend. Pt states that the majority of movement is pain free. Pt states that the frequency in RUE shoulder pain has reduced significantly. Pt's pain has improved and now is in the dynamic exercise phase in her POC. Future Appointments   Date Time Provider Reginald Corrigan   2021 10:15 AM Dario Hsu PT [facility]   2021 10:15 AM Dario Hsu PT [facility]       Pain: Initial:  3/10 right shoulder and posterior scapular region still present, reduction in painful RUE arc has been shown via RUE AROM observation. Pt states that with increasing workload the RUE is showing no signs of increased pain. Post Session:  2/10 right shoulder and posterior scapular region.  Pt did show signs of reduced painful arc and a increase in [employer] RUE strength present post tx today 2021        Medications Last Reviewed:  7/19/2021  Updated Objective Findings: PER initial evaluation. TREATMENT:     THERAPEUTIC ACTIVITY: ( 0 minutes): Therapeutic activities per grid below to improve mobility, strength, balance and dynamic movement of shoulder - right to improve functional lifting, carrying, reaching and overhead activites. Required no visual, verbal, manual and tactile cues to promote static and dynamic balance in standing, promote coordination of right, upper extremity(s) and promote motor control of right, upper extremity(s). THERAPEUTIC EXERCISE: (45 minutes):  Exercises per grid below to improve mobility, strength, balance, coordination and dynamic movement of shoulder - right to improve functional carrying, reaching, catching and overhead activites. Required no visual, verbal, manual and tactile cues to promote proper body alignment, promote proper body posture and promote proper body mechanics. Progressed resistance, range, repetitions and complexity of movement as indicated. Slow performance with additional reflection with block practice. NEUROMUSCULAR RE-EDUCATION: (0 minutes):  Exercise/activities per grid below to improve coordination, posture and proprioception. Required no visual, verbal, manual and tactile cues to promote static and dynamic balance in standing, promote coordination of right, upper extremity(s) and promote motor control of right, upper extremity(s). MANUAL THERAPY: (8 minutes): Joint mobilization, Soft tissue mobilization and Manipulation was utilized and necessary because of the patient's restricted joint motion, painful spasm, loss of articular motion and restricted motion of soft tissue. MODALITIES: (15 minutes):      Vasopneumatic Compression Cold 15 min intermittent moderate compression    1 unit  pt is seated, compression sleeve covered posterior/anterior/lateral aspects of the RUE, intermittent moderate compression at 44 degrees.  Pt used a pillow for RUE support into horizontal ABD/FLX/IR of the RUE when receiving cryotherapy. Date:  7/19/21             MANUAL 8 mins           Manual supine 90/90 PROM R shldr ER/IR  4 mins           Manual supine  Posterior/Inferior glides grade 2-3 force MOBs R Shldr  4 mins            Activity/Exercise 45 mins           UBE  8 mins 4 forward and 4    backward level 4           Wall Roly's 2 x 20 8 mins           Standing Y's Dumbbell 3x10 3#          Standing T's Dumbbell 3x10 1#          Standing band pull aparts open can  3x10 red theraband           Isometric  held          Seated Scapular retraction  held          Access Code: [email or web address]  URL: [email or web address]  Date: 06/09/2021  Prepared by: Arun Burgess    Exercises  Isometric Shoulder Flexion - 2 x daily - 7 x weekly - 10 reps - 1 sets - 5 hold  Isometric Shoulder External Rotation - 2 x daily - 7 x weekly - 10 reps - 1 sets - 5 hold  Isometric Shoulder Internal Rotation - 2 x daily - 7 x weekly - 10 reps - 1 sets - 5 hold  Isometric Shoulder Adduction - 2 x daily - 7 x weekly - 10 reps - 1 sets - 5 hold  Isometric Shoulder Abduction at Wall - 2 x daily - 7 x weekly - 10 reps - 1 sets - 5 hold  Isometric Shoulder Extension at Wall - 2 x daily - 7 x weekly - 10 reps - 1 sets - 5 hold  Standing Scapular Retraction - 2 x daily - 7 x weekly - 10 reps - 1 sets - 5 hold    Patient Education  Ice  Shoulder Impingement    [facility] Portal  Treatment/Session Summary:    · Response to Treatment:  Pt exhibited slight pain when assessing ROM. When performing manual interventions to the RUE, the pt received more AROM w/o pain. Pt's s/s in the RUE have decreased. Pt noticed a reduce in overall RUE s/s. Pt is progressing into more dynamic RUE shoulder stability exercises    ·  Communication/Consultation:  HEP program  Given to patient.       · Equipment provided today:  None today  · Recommendations/Intent for next treatment session: Next visit will focus on dynamic movement and stability on the R shldr. Dynamic there-ex tx will be mostly guided on the lvl of irritability and pain. Pt will progress PRN at this moment in time in time. Pt has made some available space in the subacromial region however the pt's RUE shows some residual soreness from working outside over this past week. The pt's ability to gain strength and stability is evident but would still need skilled therapy to address the pt's remaining deficits. Pt is progressing to more dynamic stability exercises PRN.   Will continue to monitor s/s      Total Treatment Billable Duration:  53 minutes times and [address]    PT Patient Time In/Time Out  Time In: 1010  Time Out: Jaama 45, SPT    Future Appointments   Date Time Provider Reginald Corrigan   7/22/2021 10:15 AM Benjpepito Skelton, PT [facility]   7/26/2021 10:15 AM Benjamen Nafisa, PT [facility] S[facility]     Visit Approval Visit # Therapist initials Date A NS / Cx < 24 hr >24 hr Cx Comments    1 Arun  6/8/21 [x]  [] [] Initial evaluation    2 GP/Arun 6/14/21 [x] [] []     3 GP/Arun 6/17/21 [x] [] []     4 GP/Arun 6/21/21 [x] [] []     5 GP/Arun 6/24/21 [x] [] []     6 GP/Arun 6/28/21 [x] [] []     7 GP/Arun 7/1/21 [x] [] []        [] [x] [] Canceled     8  GP/Arun 7/7/21 [x] [] []     9 GP/Arun 7/12/21 [x] [] []     10 GP/Arun 7/15/21 [x] [] [] Progress Note     11 GP/Arun 7/19/21 [x] [] [] 1       [] [] []        [] [] []        [] [] []        [] [] []        [] [] []        [] [] []

## 2021-07-22 ENCOUNTER — HOSPITAL ENCOUNTER (OUTPATIENT)
Dept: PHYSICAL THERAPY | Age: 81
Discharge: HOME | End: 2021-07-22
Payer: MEDICARE

## 2021-07-22 PROCEDURE — 97140 MANUAL THERAPY 1/> REGIONS: CPT

## 2021-07-22 PROCEDURE — 97016 VASOPNEUMATIC DEVICE THERAPY: CPT

## 2021-07-22 PROCEDURE — 97110 THERAPEUTIC EXERCISES: CPT

## 2021-07-22 NOTE — PROGRESS NOTES
Dario Olea  : 1940  [place]  Secondary:  Andrew Toro [address], [place], [facility]  [phone]   [phone]        OUTPATIENT PHYSICAL THERAPY: Daily Treatment Note 2021  Visit Count:  12     ICD-10: Treatment Diagnosis: Pain in right shoulder [M25.511]  Other chronic pain [G89.29]  Precautions/Allergies:   Patient has no allergy information on record. TREATMENT PLAN:  Effective Dates: 2021 TO 2021 (90 days). Frequency/Duration: Up to 3 times a week for 90 Day(s)  Pain in right shoulder [M25.511]  Other chronic pain [G89.29]  MEDICAL/REFERRING DIAGNOSIS:  Pain in right shoulder [M25.511]  Other chronic pain [G89.29]   DATE OF ONSET: Acute on Chronic 2021  REFERRING PHYSICIAN: Elmer Ford MD  Pre-treatment Symptoms/Complaints:  Pt states that the pain has subsided for the majority of this past weekend. Pt states pain has decreased overall and now can achieve more RUE AROM w/o the presence of a painful arch. Pt reports no increased pain over the course of the weekend. Pt states that the majority of movement is pain free. Pt states that the frequency in RUE shoulder pain has reduced significantly. Pt's pain has improved and now is in the dynamic exercise phase in her POC. Future Appointments   Date Time Provider Reginald Corrigan   2021 10:15 AM Gamaliel Bailey, PT [facility]   2021 10:15 AM Gamaliel Bailey, PT [facility]   2021 10:15 AM Gamaliel Bailey, PT [facility]   2021 10:15 AM Gamaliel Bailey, PT [facility]       Pain: Initial:  3/10 right shoulder and posterior scapular region still present, reduction in painful RUE arc has been shown via RUE AROM observation. Pt states that with increasing workload the RUE is showing no signs of increased pain. Post Session:  2/10 right shoulder and posterior scapular region.  Pt did show signs of reduced painful arc and a increase in [employer]OT[employer] RUE strength present post tx today 7/26/2021        Medications Last Reviewed:  7/22/2021  Updated Objective Findings: PER initial evaluation. TREATMENT:     THERAPEUTIC ACTIVITY: ( 0 minutes): Therapeutic activities per grid below to improve mobility, strength, balance and dynamic movement of shoulder - right to improve functional lifting, carrying, reaching and overhead activites. Required no visual, verbal, manual and tactile cues to promote static and dynamic balance in standing, promote coordination of right, upper extremity(s) and promote motor control of right, upper extremity(s). THERAPEUTIC EXERCISE: (45 minutes):  Exercises per grid below to improve mobility, strength, balance, coordination and dynamic movement of shoulder - right to improve functional carrying, reaching, catching and overhead activites. Required no visual, verbal, manual and tactile cues to promote proper body alignment, promote proper body posture and promote proper body mechanics. Progressed resistance, range, repetitions and complexity of movement as indicated. Slow performance with additional reflection with block practice. NEUROMUSCULAR RE-EDUCATION: (0 minutes):  Exercise/activities per grid below to improve coordination, posture and proprioception. Required no visual, verbal, manual and tactile cues to promote static and dynamic balance in standing, promote coordination of right, upper extremity(s) and promote motor control of right, upper extremity(s). MANUAL THERAPY: (8 minutes): Joint mobilization, Soft tissue mobilization and Manipulation was utilized and necessary because of the patient's restricted joint motion, painful spasm, loss of articular motion and restricted motion of soft tissue.    MODALITIES: (15 minutes):      Vasopneumatic Compression Cold 15 min intermittent moderate compression    1 unit  pt is seated, compression sleeve covered posterior/anterior/lateral aspects of the RUE, intermittent moderate compression at 44 degrees. Pt used a pillow for RUE support into horizontal ABD/FLX/IR of the RUE when receiving cryotherapy. Date:  7/22/21             MANUAL 8 mins           Manual supine 90/90 PROM R shldr ER/IR  4 mins           Manual supine  Posterior/Inferior glides grade 2-3 force MOBs R Shldr  4 mins            Activity/Exercise 45 mins           UBE  8 mins 4 forward and 4    backward level 4           Wall Roly's 2 x 20 8 mins           Standing Y's Dumbbell 3x10 3#          Standing T's Dumbbell 3x10 1#          Standing band pull aparts open can  3x10 red theraband           Isometric  held          Seated Scapular retraction  held          Access Code: [email or web address]  URL: [email or web address]  Date: 06/09/2021  Prepared by: Arun Johanne    Exercises  Isometric Shoulder Flexion - 2 x daily - 7 x weekly - 10 reps - 1 sets - 5 hold  Isometric Shoulder External Rotation - 2 x daily - 7 x weekly - 10 reps - 1 sets - 5 hold  Isometric Shoulder Internal Rotation - 2 x daily - 7 x weekly - 10 reps - 1 sets - 5 hold  Isometric Shoulder Adduction - 2 x daily - 7 x weekly - 10 reps - 1 sets - 5 hold  Isometric Shoulder Abduction at Wall - 2 x daily - 7 x weekly - 10 reps - 1 sets - 5 hold  Isometric Shoulder Extension at Wall - 2 x daily - 7 x weekly - 10 reps - 1 sets - 5 hold  Standing Scapular Retraction - 2 x daily - 7 x weekly - 10 reps - 1 sets - 5 hold    Patient Education  Ice  Shoulder Impingement    [facility] Portal  Treatment/Session Summary:    · Response to Treatment:  Pt exhibited slight pain when assessing ROM. When performing manual interventions to the RUE, the pt received more AROM w/o pain. Pt's s/s in the RUE have decreased. Pt noticed a reduce in overall RUE s/s. Pt is progressing into more dynamic RUE shoulder stability exercises    ·  Communication/Consultation:  HEP program  Given to patient. · Equipment provided today:  None today  · Recommendations/Intent for next treatment session: Next visit will focus on dynamic movement and stability on the R shldr. Dynamic there-ex tx will be mostly guided on the lvl of irritability and pain. Pt will progress PRN at this moment in time in time. Pt has made some available space in the subacromial region however the pt's RUE shows some residual soreness from working outside over this past week. The pt's ability to gain strength and stability is evident but would still need skilled therapy to address the pt's remaining deficits. Pt is progressing to more dynamic stability exercises PRN.   Will continue to monitor s/s      Total Treatment Billable Duration:  53 minutes times and [facility] 98.    PT Patient Time In/Time Out  Time In: 1005  Time Out: Trg Humberto 91, PT    Future Appointments   Date Time Provider Reginald Corrigan   8/2/2021 10:15 AM Senjonnathan Jeannie, PT [facility]   8/5/2021 10:15 AM Senjonnathan Jeannie, PT [facility]   8/9/2021 10:15 AM Senora Jeannie, PT [facility]   8/12/2021 10:15 AM Senora Jeannie, PT [facility]     Visit Approval Visit # Therapist initials Date A NS / Cx < 24 hr >24 hr Cx Comments    1 Arun  6/8/21 [x]  [] [] Initial evaluation    2 GP/Arun 6/14/21 [x] [] []     3 GP/Arun 6/17/21 [x] [] []     4 GP/Arun 6/21/21 [x] [] []     5 GP/Arun 6/24/21 [x] [] []     6 GP/Arun 6/28/21 [x] [] []     7 GP/Arun 7/1/21 [x] [] []        [] [x] [] Canceled     8  GP/Arun 7/7/21 [x] [] []     9 GP/Arun 7/12/21 [x] [] []     10 GP/Arun 7/15/21 [x] [] [] Progress Note     11 GP/Arun 7/19/21 [x] [] [] 1    12 GP/Arun 7/22/21 [x] [] [] 2       [] [] []        [] [] []        [] [] []        [] [] []        [] [] []

## 2021-07-26 ENCOUNTER — HOSPITAL ENCOUNTER (OUTPATIENT)
Dept: PHYSICAL THERAPY | Age: 81
Discharge: HOME | End: 2021-07-26
Payer: MEDICARE

## 2021-07-26 PROCEDURE — 97140 MANUAL THERAPY 1/> REGIONS: CPT

## 2021-07-26 PROCEDURE — 97110 THERAPEUTIC EXERCISES: CPT

## 2021-07-26 PROCEDURE — 97016 VASOPNEUMATIC DEVICE THERAPY: CPT

## 2021-07-26 NOTE — PROGRESS NOTES
Fidelia Kay  : 1940  [place]  Secondary:  [address]  at [facility]  [address]", [place] [address] [address] [facility]  [phone]   [phone]        OUTPATIENT PHYSICAL THERAPY: Daily Treatment Note 2021  Visit Count:  13     ICD-10: Treatment Diagnosis: Pain in right shoulder [M25.511]  Other chronic pain [G89.29]  Precautions/Allergies:   Patient has no allergy information on record. TREATMENT PLAN:  Effective Dates: 2021 TO 2021 (90 days). Frequency/Duration: Up to 3 times a week for 90 Day(s)  Pain in right shoulder [M25.511]  Other chronic pain [G89.29]  MEDICAL/REFERRING DIAGNOSIS:  Pain in right shoulder [M25.511]  Other chronic pain [G89.29]   DATE OF ONSET: Acute on Chronic 2021  REFERRING PHYSICIAN: Ryan Rivas MD  Pre-treatment Symptoms/Complaints:  Pt states that the pain has subsided for the majority of this past weekend. Pt states pain has decreased overall and now can achieve more RUE AROM w/o the presence of a painful arch. Pt reports no increased pain over the course of the weekend. Pt states that the majority of movement is pain free. Pt states that the frequency in RUE shoulder pain has reduced significantly. Pt's pain has improved and now is in the dynamic exercise phase in her POC. Future Appointments   Date Time Provider Reginald Corrigan   2021 10:15 AM Harley Solis, PT [facility]   2021 10:15 AM Harley Solis, PT [facility]   2021 10:15 AM Harley Solis, PT [facility]   2021 10:15 AM Harley Solis, PT [facility]       Pain: Initial:  3/10 right shoulder and posterior scapular region still present, reduction in painful RUE arc has been shown via RUE AROM observation. Post Session:  2/10 right shoulder and posterior scapular region.  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 TREATMENT:     THERAPEUTIC ACTIVITY: ( 0 minutes): Therapeutic activities per grid below to improve mobility, strength, balance and dynamic movement of shoulder - right to improve functional lifting, carrying, reaching and overhead activites. Required no visual, verbal, manual and tactile cues to promote static and dynamic balance in standing, promote coordination of right, upper extremity(s) and promote motor control of right, upper extremity(s). THERAPEUTIC EXERCISE: (45 minutes):  Exercises per grid below to improve mobility, strength, balance, coordination and dynamic movement of shoulder - right to improve functional carrying, reaching, catching and overhead activites. Required no visual, verbal, manual and tactile cues to promote proper body alignment, promote proper body posture and promote proper body mechanics. Progressed resistance, range, repetitions and complexity of movement as indicated. Slow performance with additional reflection with block practice. NEUROMUSCULAR RE-EDUCATION: (0 minutes):  Exercise/activities per grid below to improve coordination, posture and proprioception. Required no visual, verbal, manual and tactile cues to promote static and dynamic balance in standing, promote coordination of right, upper extremity(s) and promote motor control of right, upper extremity(s). MANUAL THERAPY: (8 minutes): Joint mobilization, Soft tissue mobilization and Manipulation was utilized and necessary because of the patient's restricted joint motion, painful spasm, loss of articular motion and restricted motion of soft tissue. MODALITIES: (15 minutes):      Vasopneumatic Compression Cold 15 min intermittent moderate compression    1 unit  pt is seated, compression sleeve covered posterior/anterior/lateral aspects of the RUE, intermittent moderate compression at 44 degrees.  Pt used a pillow for RUE support into horizontal ABD/FLX/IR of the RUE when receiving cryotherapy. Date:  7/26/21             MANUAL 8 mins           Manual supine 90/90 PROM R shldr ER/IR  4 mins           Manual supine  Posterior/Inferior glides grade 2-3 force MOBs R Shldr  4 mins            Activity/Exercise 45 mins           UBE  8 mins 4 forward and 4    backward level 4           Wall Roly's 2 x 20 8 mins           Standing Y's Dumbbell 3x10 3#          Standing T's Dumbbell 3x10 1#          Standing band pull aparts open can  3x10 red theraband           Isometric  held          Seated Scapular retraction  held          Access Code: [email or web address]  URL: [email or web address]  Date: 06/09/2021  Prepared by: Arun [place]    Exercises  Isometric Shoulder Flexion - 2 x daily - 7 x weekly - 10 reps - 1 sets - 5 hold  Isometric Shoulder External Rotation - 2 x daily - 7 x weekly - 10 reps - 1 sets - 5 hold  Isometric Shoulder Internal Rotation - 2 x daily - 7 x weekly - 10 reps - 1 sets - 5 hold  Isometric Shoulder Adduction - 2 x daily - 7 x weekly - 10 reps - 1 sets - 5 hold  Isometric Shoulder Abduction at Wall - 2 x daily - 7 x weekly - 10 reps - 1 sets - 5 hold  Isometric Shoulder Extension at Wall - 2 x daily - 7 x weekly - 10 reps - 1 sets - 5 hold  Standing Scapular Retraction - 2 x daily - 7 x weekly - 10 reps - 1 sets - 5 hold    Patient Education  Ice  Shoulder Impingement    [facility] Portal  Treatment/Session Summary:    · Response to Treatment:  Pt exhibited slight pain when assessing ROM. When performing manual interventions to the RUE, the pt received more AROM w/o pain. Pt's s/s in the RUE have decreased. Pt noticed a reduce in overall RUE s/s. Pt is progressing into more dynamic RUE shoulder stability exercises    ·  Communication/Consultation:  HEP program  Given to patient.       · Equipment provided today:  None today  · Recommendations/Intent for next treatment session: Next visit will focus on dynamic movement and stability on the R shldr. Dynamic there-ex tx will be mostly guided on the lvl of irritability and pain. Pt will progress PRN at this moment in time in time. Pt has made some available space in the subacromial region however the pt's RUE shows some residual soreness from working outside over this past week. The pt's ability to gain strength and stability is evident but would still need skilled therapy to address the pt's remaining deficits. Pt is progressing to more dynamic stability exercises PRN.   Will continue to monitor s/s      Total Treatment Billable Duration:  53 minutes times and [address].    PT Patient Time In/Time Out  Time In: 1003  Time Out: 1112  Alem Claire, SPT    Future Appointments   Date Time Provider Reginald Corrigan   8/2/2021 10:15 AM Roth Rye, PT [facility]   8/5/2021 10:15 AM Roth Rye, PT [facility]   8/9/2021 10:15 AM Roth Rye, PT [facility]   8/12/2021 10:15 AM Roth Rye, PT [facility]     Visit Approval Visit # Therapist initials Date A NS / Cx < 24 hr >24 hr Cx Comments    1 Arun  6/8/21 [x]  [] [] Initial evaluation    2 GP/Arun 6/14/21 [x] [] []     3 GP/Arun 6/17/21 [x] [] []     4 GP/Arun 6/21/21 [x] [] []     5 GP/Arun 6/24/21 [x] [] []     6 GP/Arun 6/28/21 [x] [] []     7 GP/Arun 7/1/21 [x] [] []        [] [x] [] Canceled     8  GP/Arun 7/7/21 [x] [] []     9 GP/Arun 7/12/21 [x] [] []     10 GP/Arun 7/15/21 [x] [] [] Progress Note     11 GP/Arun 7/19/21 [x] [] [] 1    12 GP/Arun 7/22/21 [x] [] [] 2    13 GP/Arun 7/26/21 [x] [] [] 3       [] [] []        [] [] []        [] [] []        [] [] []

## 2021-08-02 ENCOUNTER — HOSPITAL ENCOUNTER (OUTPATIENT)
Dept: PHYSICAL THERAPY | Age: 81
Discharge: HOME | End: 2021-08-02
Payer: MEDICARE

## 2021-08-02 PROCEDURE — 97140 MANUAL THERAPY 1/> REGIONS: CPT

## 2021-08-02 PROCEDURE — 97110 THERAPEUTIC EXERCISES: CPT

## 2021-08-02 PROCEDURE — 97016 VASOPNEUMATIC DEVICE THERAPY: CPT

## 2021-08-02 NOTE — PROGRESS NOTES
Justa Costello  : 1940  [place]  Secondary:  [address] [place]  at [facility], [address]  [phone]   [phone]        OUTPATIENT PHYSICAL THERAPY: Daily Treatment Note 2021  Visit Count:  14     ICD-10: Treatment Diagnosis: Pain in right shoulder [M25.511]  Other chronic pain [G89.29]  Precautions/Allergies:   Patient has no allergy information on record. TREATMENT PLAN:  Effective Dates: 2021 TO 2021 (90 days). Frequency/Duration: Up to 3 times a week for 90 Day(s)  Pain in right shoulder [M25.511]  Other chronic pain [G89.29]  MEDICAL/REFERRING DIAGNOSIS:  Pain in right shoulder [M25.511]  Other chronic pain [G89.29]   DATE OF ONSET: Acute on Chronic 2021  REFERRING PHYSICIAN: Frankie Mensah MD  Pre-treatment Symptoms/Complaints:  Pt states that the pain has subsided for the majority of this past weekend. Pt states pain has decreased overall and now can achieve more RUE AROM w/o the presence of a painful arch. Pt reports no increased pain over the course of the weekend. Pt states that the majority of movement is pain free. Pt states that the frequency in RUE shoulder pain has reduced significantly. Pt's pain has improved and now is in the dynamic exercise phase in her POC. Future Appointments   Date Time Provider Reginald Corrigan   2021 10:15 AM Kevin Betts, PT [facility]   2021  7:00 PM Kevin Betts PT [facility]   2021 10:15 AM Kevin Betts, PT [facility]       Pain: Initial:  3/10 right shoulder and posterior scapular region still present, reduction in painful RUE arc has been shown via RUE AROM observation. Pt states some elevated RUE upper trap soreness/spasm. Post Session:  2/10 right shoulder and posterior scapular region.  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 TREATMENT:     THERAPEUTIC ACTIVITY: ( 0 minutes): Therapeutic activities per grid below to improve mobility, strength, balance and dynamic movement of shoulder - right to improve functional lifting, carrying, reaching and overhead activites. Required no visual, verbal, manual and tactile cues to promote static and dynamic balance in standing, promote coordination of right, upper extremity(s) and promote motor control of right, upper extremity(s). THERAPEUTIC EXERCISE: (45 minutes):  Exercises per grid below to improve mobility, strength, balance, coordination and dynamic movement of shoulder - right to improve functional carrying, reaching, catching and overhead activites. Required no visual, verbal, manual and tactile cues to promote proper body alignment, promote proper body posture and promote proper body mechanics. Progressed resistance, range, repetitions and complexity of movement as indicated. Slow performance with additional reflection with block practice. NEUROMUSCULAR RE-EDUCATION: (0 minutes):  Exercise/activities per grid below to improve coordination, posture and proprioception. Required no visual, verbal, manual and tactile cues to promote static and dynamic balance in standing, promote coordination of right, upper extremity(s) and promote motor control of right, upper extremity(s). MANUAL THERAPY: (8 minutes): Joint mobilization, Soft tissue mobilization and Manipulation was utilized and necessary because of the patient's restricted joint motion, painful spasm, loss of articular motion and restricted motion of soft tissue. MODALITIES: (15 minutes):      Vasopneumatic Compression Cold 15 min intermittent moderate compression    1 unit  pt is seated, compression sleeve covered posterior/anterior/lateral aspects of the RUE, intermittent moderate compression at 44 degrees.  Pt used a pillow for RUE support into horizontal ABD/FLX/IR of the RUE when receiving cryotherapy. Date:  8/2/21             MANUAL 8 mins           Manual supine 90/90 PROM R shldr ER/IR  4 mins           Manual supine  Posterior/Inferior glides grade 2-3 force MOBs R Shldr  4 mins            Activity/Exercise 45 mins           UBE  8 mins 4 forward and 4    backward level 4           Wall Roly's 2 x 20 8 mins           Standing Y's Dumbbell 3x10 3#          Standing T's Dumbbell 3x10 1#          Standing band pull aparts open can  3x10 red theraband           Isometric  held          Seated Scapular retraction  held          Access Code: [email or web address]  URL: [email or web address]  Date: 06/09/2021  Prepared by: Arun Johanne    Exercises  Isometric Shoulder Flexion - 2 x daily - 7 x weekly - 10 reps - 1 sets - 5 hold  Isometric Shoulder External Rotation - 2 x daily - 7 x weekly - 10 reps - 1 sets - 5 hold  Isometric Shoulder Internal Rotation - 2 x daily - 7 x weekly - 10 reps - 1 sets - 5 hold  Isometric Shoulder Adduction - 2 x daily - 7 x weekly - 10 reps - 1 sets - 5 hold  Isometric Shoulder Abduction at Wall - 2 x daily - 7 x weekly - 10 reps - 1 sets - 5 hold  Isometric Shoulder Extension at Wall - 2 x daily - 7 x weekly - 10 reps - 1 sets - 5 hold  Standing Scapular Retraction - 2 x daily - 7 x weekly - 10 reps - 1 sets - 5 hold    Patient Education  Ice  Shoulder Impingement    [facility] Portal  Treatment/Session Summary:    · Response to Treatment:  Pt exhibited slight pain when assessing ROM. When performing manual interventions to the RUE, the pt received more AROM w/o pain. Pt's s/s in the RUE have decreased. Pt noticed a reduce in overall RUE s/s. Pt is progressing into more dynamic RUE shoulder stability exercises    ·  Communication/Consultation:  HEP program  Given to patient.       · Equipment provided today:  None today  · Recommendations/Intent for next treatment session: Next visit will focus on dynamic movement and stability on the R shldr. Dynamic there-ex tx will be mostly guided on the lvl of irritability and pain. Pt will progress PRN at this moment in time in time. Pt has made some available space in the subacromial region however the pt's RUE shows some residual soreness from working outside over this past week. The pt's ability to gain strength and stability is evident but would still need skilled therapy to address the pt's remaining deficits. Pt is progressing to more dynamic stability exercises PRN.   Will continue to monitor s/s      Total Treatment Billable Duration:  53 minutes times and [address]    PT Patient Time In/Time Out  Time In: 1000  Time Out: 1108  THERESA Gil    Future Appointments   Date Time Provider Reginald Corrigan   8/5/2021 10:15 AM Lavaun Flax, PT [facility]   8/9/2021  7:00 PM Lavaun Flax, PT [facility]   8/12/2021 10:[facility]     Visit Approval Visit # Therapist initials Date A NS / Cx < 24 hr >24 hr Cx Comments    1 Arun  6/8/21 [x]  [] [] Initial evaluation    2 GP/Arun 6/14/21 [x] [] []     3 GP/Arun 6/17/21 [x] [] []     4 GP/Arun 6/21/21 [x] [] []     5 GP/Arun 6/24/21 [x] [] []     6 GP/Arun 6/28/21 [x] [] []     7 GP/Arun 7/1/21 [x] [] []        [] [x] [] Canceled     8  GP/Arun 7/7/21 [x] [] []     9 GP/Arun 7/12/21 [x] [] []     10 GP/Arun 7/15/21 [x] [] [] Progress Note     11 GP/Arun 7/19/21 [x] [] [] 1    12 GP/Arun 7/22/21 [x] [] [] 2    13 GP/Arun 7/26/21 [x] [] [] 3    14 GP/Arun 8/2/21 [x] [] [] 4       [] [] []        [] [] []        [] [] []

## 2021-08-05 ENCOUNTER — HOSPITAL ENCOUNTER (OUTPATIENT)
Dept: PHYSICAL THERAPY | Age: 81
Discharge: HOME | End: 2021-08-05
Payer: MEDICARE

## 2021-08-05 PROCEDURE — 97140 MANUAL THERAPY 1/> REGIONS: CPT

## 2021-08-05 PROCEDURE — 97016 VASOPNEUMATIC DEVICE THERAPY: CPT

## 2021-08-05 PROCEDURE — 97110 THERAPEUTIC EXERCISES: CPT

## 2021-08-05 NOTE — PROGRESS NOTES
Kevin Montoya  : 1940  [place]  Secondary:  [address] [place]  at [facility]  [address], [address] [facility] [place], [facility] [address] [facility]  [phone]   [phone]        OUTPATIENT PHYSICAL THERAPY: Daily Treatment Note 2021  Visit Count:  15     ICD-10: Treatment Diagnosis: Pain in right shoulder [M25.511]  Other chronic pain [G89.29]  Precautions/Allergies:   Patient has no allergy information on record. TREATMENT PLAN:  Effective Dates: 2021 TO 2021 (90 days). Frequency/Duration: Up to 3 times a week for 90 Day(s)  Pain in right shoulder [M25.511]  Other chronic pain [G89.29]  MEDICAL/REFERRING DIAGNOSIS:  Pain in right shoulder [M25.511]  Other chronic pain [G89.29]   DATE OF ONSET: Acute on Chronic 2021  REFERRING PHYSICIAN: Jude Palmer MD  Pre-treatment Symptoms/Complaints:  Pt states that the pain has subsided for the majority of this past weekend. Pt states pain has decreased overall and now can achieve more RUE AROM w/o the presence of a painful arch. Pt reports no increased pain over the course of the weekend. Pt states that the majority of movement is pain free. Pt states that the frequency in RUE shoulder pain has reduced significantly. Pt's pain has improved and now is in the dynamic exercise phase in her POC. Future Appointments   Date Time Provider Reginald Corrigan   2021  7:00 PM Hank Sanchez [facility]   2021 10:15 AM Brenna Rebollar, [facility]       Pain: Initial:  3/10 right shoulder and posterior scapular region still present, reduction in painful RUE arc has been shown via RUE AROM observation. Pt states some elevated RUE upper trap soreness/spasm. Post Session:  2/10 right shoulder and posterior scapular region.  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 TREATMENT:     THERAPEUTIC ACTIVITY: ( 0 minutes): Therapeutic activities per grid below to improve mobility, strength, balance and dynamic movement of shoulder - right to improve functional lifting, carrying, reaching and overhead activites. Required no visual, verbal, manual and tactile cues to promote static and dynamic balance in standing, promote coordination of right, upper extremity(s) and promote motor control of right, upper extremity(s). THERAPEUTIC EXERCISE: (45 minutes):  Exercises per grid below to improve mobility, strength, balance, coordination and dynamic movement of shoulder - right to improve functional carrying, reaching, catching and overhead activites. Required no visual, verbal, manual and tactile cues to promote proper body alignment, promote proper body posture and promote proper body mechanics. Progressed resistance, range, repetitions and complexity of movement as indicated. Slow performance with additional reflection with block practice. NEUROMUSCULAR RE-EDUCATION: (0 minutes):  Exercise/activities per grid below to improve coordination, posture and proprioception. Required no visual, verbal, manual and tactile cues to promote static and dynamic balance in standing, promote coordination of right, upper extremity(s) and promote motor control of right, upper extremity(s). MANUAL THERAPY: (8 minutes): Joint mobilization, Soft tissue mobilization and Manipulation was utilized and necessary because of the patient's restricted joint motion, painful spasm, loss of articular motion and restricted motion of soft tissue. MODALITIES: (15 minutes):      Vasopneumatic Compression Cold 15 min intermittent moderate compression    1 unit  pt is seated, compression sleeve covered posterior/anterior/lateral aspects of the RUE, intermittent moderate compression at 44 degrees.  Pt used a pillow for RUE support into horizontal ABD/FLX/IR of the RUE when receiving cryotherapy. Date:  8/5/21             MANUAL 8 mins           Manual supine 90/90 PROM R shldr ER/IR  4 mins           Manual supine  Posterior/Inferior glides grade 2-3 force MOBs R Shldr  4 mins            Activity/Exercise 45 mins           UBE  8 mins 4 forward and 4    backward level 4           Wall Roly's 2 x 20 8 mins           Standing Y's Dumbbell 3x10 3#          Standing T's Dumbbell 3x10 1#          Standing band pull aparts open can  3x10 red theraband           Isometric  held          Seated Scapular retraction  held          Access Code: [email or web address]  URL: [email or web address]  Date: 06/09/2021  Prepared by: Arun [place]    Exercises  Isometric Shoulder Flexion - 2 x daily - 7 x weekly - 10 reps - 1 sets - 5 hold  Isometric Shoulder External Rotation - 2 x daily - 7 x weekly - 10 reps - 1 sets - 5 hold  Isometric Shoulder Internal Rotation - 2 x daily - 7 x weekly - 10 reps - 1 sets - 5 hold  Isometric Shoulder Adduction - 2 x daily - 7 x weekly - 10 reps - 1 sets - 5 hold  Isometric Shoulder Abduction at Wall - 2 x daily - 7 x weekly - 10 reps - 1 sets - 5 hold  Isometric Shoulder Extension at Wall - 2 x daily - 7 x weekly - 10 reps - 1 sets - 5 hold  Standing Scapular Retraction - 2 x daily - 7 x weekly - 10 reps - 1 sets - 5 hold    Patient Education  Ice  Shoulder Impingement    [facility] Portal  Treatment/Session Summary:    · Response to Treatment:  Pt exhibited slight pain when assessing ROM. When performing manual interventions to the RUE, the pt received more AROM w/o pain. Pt's s/s in the RUE have decreased. Pt noticed a reduce in overall RUE s/s. Pt is progressing into more dynamic RUE shoulder stability exercises    ·  Communication/Consultation:  HEP program  Given to patient.       · Equipment provided today:  None today  · Recommendations/Intent for next treatment session: Next visit will focus on dynamic movement and stability on the R shldr. Dynamic there-ex tx will be mostly guided on the lvl of irritability and pain. Pt will progress PRN at this moment in time in time. Pt has made some available space in the subacromial region however the pt's RUE shows some residual soreness from working outside over this past week. The pt's ability to gain strength and stability is evident but would still need skilled therapy to address the pt's remaining deficits. Pt is progressing to more dynamic stability exercises PRN.   Will continue to monitor s/s      Total Treatment Billable Duration:  53 minutes times and [address]    PT Patient Time In/Time Out  Time In: 1000  Time Out: 1110  Sridevi Burgess, SPT    Future Appointments   Date Time Provider Regianld Corrigan   8/9/2021  7:00 PM Claudia Crowell, [facility]   8/12/2021 10:15 AM Claudia Crowell, PT [facility]     Visit Approval Visit # Therapist initials Date A NS / Cx < 24 hr >24 hr Cx Comments    1 Arun  6/8/21 [x]  [] [] Initial evaluation    2 GP/Arun 6/14/21 [x] [] []     3 GP/Arun 6/17/21 [x] [] []     4 GP/Arun 6/21/21 [x] [] []     5 GP/Arun 6/24/21 [x] [] []     6 GP/Arun 6/28/21 [x] [] []     7 GP/Arun 7/1/21 [x] [] []        [] [x] [] Canceled     8  GP/Arun 7/7/21 [x] [] []     9 GP/Arun 7/12/21 [x] [] []     10 GP/Arun 7/15/21 [x] [] [] Progress Note     11 GP/Arun 7/19/21 [x] [] [] 1    12 GP/Arun 7/22/21 [x] [] [] 2    13 GP/Arun 7/26/21 [x] [] [] 3    14 GP/Arun 8/2/21 [x] [] [] 4    15 GP/Arun 8/5/21 [x] [] [] 5       [] [] []        [] [] []

## 2021-08-09 ENCOUNTER — HOSPITAL ENCOUNTER (OUTPATIENT)
Dept: PHYSICAL THERAPY | Age: 81
Discharge: HOME | End: 2021-08-09
Payer: MEDICARE

## 2021-08-09 PROCEDURE — 97110 THERAPEUTIC EXERCISES: CPT

## 2021-08-09 PROCEDURE — 97016 VASOPNEUMATIC DEVICE THERAPY: CPT

## 2021-08-09 PROCEDURE — 97140 MANUAL THERAPY 1/> REGIONS: CPT

## 2021-08-09 NOTE — THERAPY DISCHARGE
Junito Hernandez has been seen in physical therapy from 6/14/21 to 8/9/21 for 16 visits. Treatment has been discontinued at this time due to goals met and completion of current scheduling and POT. Thank you for this referral. Ebonie Richey, PT, DPT, OCS.

## 2021-08-09 NOTE — PROGRESS NOTES
Kevin Montoya  : 1940  [place]  Secondary:   [place]  at [facility]  [address], [address]", [place] [address] [address] [facility]  [phone]   [phone]        OUTPATIENT PHYSICAL THERAPY: Daily Treatment Note 2021  Visit Count:  16     ICD-10: Treatment Diagnosis: Pain in right shoulder [M25.511]  Other chronic pain [G89.29]  Precautions/Allergies:   Patient has no allergy information on record. TREATMENT PLAN:  Effective Dates: 2021 TO 2021 (90 days). Frequency/Duration: Up to 3 times a week for 90 Day(s)  Pain in right shoulder [M25.511]  Other chronic pain [G89.29]  MEDICAL/REFERRING DIAGNOSIS:  Pain in right shoulder [M25.511]  Other chronic pain [G89.29]   DATE OF ONSET: Acute on Chronic 2021  REFERRING PHYSICIAN: Jude Palmer MD  Pre-treatment Symptoms/Complaints:  Pt states that the pain has subsided for the majority of this past weekend. Pt states pain has decreased overall and now can achieve more RUE AROM w/o the presence of a painful arch. Pt reports no increased pain over the course of the weekend. Pt states that the majority of movement is pain free. Pt states that the frequency in RUE shoulder pain has reduced significantly. Pt's pain has improved and now is in the dynamic exercise phase in her POC. Future Appointments   Date Time Provider Reginald Corrigan   2021  7:00 PM Brenna Rebollar PT [facility]       Pain: Initial:  3/10 right shoulder and posterior scapular region still present, reduction in painful RUE arc has been shown via RUE AROM observation. Pt states some elevated RUE upper trap soreness/spasm. Post Session:  2/10 right shoulder and posterior scapular region. 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      TREATMENT: THERAPEUTIC ACTIVITY: ( 0 minutes): Therapeutic activities per grid below to improve mobility, strength, balance and dynamic movement of shoulder - right to improve functional lifting, carrying, reaching and overhead activites. Required no visual, verbal, manual and tactile cues to promote static and dynamic balance in standing, promote coordination of right, upper extremity(s) and promote motor control of right, upper extremity(s). THERAPEUTIC EXERCISE: (45 minutes):  Exercises per grid below to improve mobility, strength, balance, coordination and dynamic movement of shoulder - right to improve functional carrying, reaching, catching and overhead activites. Required no visual, verbal, manual and tactile cues to promote proper body alignment, promote proper body posture and promote proper body mechanics. Progressed resistance, range, repetitions and complexity of movement as indicated. Slow performance with additional reflection with block practice. NEUROMUSCULAR RE-EDUCATION: (0 minutes):  Exercise/activities per grid below to improve coordination, posture and proprioception. Required no visual, verbal, manual and tactile cues to promote static and dynamic balance in standing, promote coordination of right, upper extremity(s) and promote motor control of right, upper extremity(s). MANUAL THERAPY: (8 minutes): Joint mobilization, Soft tissue mobilization and Manipulation was utilized and necessary because of the patient's restricted joint motion, painful spasm, loss of articular motion and restricted motion of soft tissue. MODALITIES: (15 minutes):      Vasopneumatic Compression Cold 15 min intermittent moderate compression    1 unit  pt is seated, compression sleeve covered posterior/anterior/lateral aspects of the RUE, intermittent moderate compression at 44 degrees. Pt used a pillow for RUE support into horizontal ABD/FLX/IR of the RUE when receiving cryotherapy.       Date:  8/9/21 MANUAL 13 mins           Manual supine 90/90 PROM R shldr ER/IR  4 mins           Manual supine  Posterior/Inferior glides grade 2-3 force MOBs R Shldr  4 mins            Activity/Exercise 45 mins           UBE  8 mins 4 forward and 4    backward level 4           Wall Roly's 2 x 20 8 mins           Standing Y's Dumbbell 3x10 3#          Standing T's Dumbbell 3x10 1#          Standing band pull aparts open can  3x10 red theraband           Supine towel assisted long axis distraction  5 mins           Access Code: [email or web address]  URL: [email or web address]  Date: 06/09/2021  Prepared by: Arun Johanne    Exercises  Isometric Shoulder Flexion - 2 x daily - 7 x weekly - 10 reps - 1 sets - 5 hold  Isometric Shoulder External Rotation - 2 x daily - 7 x weekly - 10 reps - 1 sets - 5 hold  Isometric Shoulder Internal Rotation - 2 x daily - 7 x weekly - 10 reps - 1 sets - 5 hold  Isometric Shoulder Adduction - 2 x daily - 7 x weekly - 10 reps - 1 sets - 5 hold  Isometric Shoulder Abduction at Wall - 2 x daily - 7 x weekly - 10 reps - 1 sets - 5 hold  Isometric Shoulder Extension at Wall - 2 x daily - 7 x weekly - 10 reps - 1 sets - 5 hold  Standing Scapular Retraction - 2 x daily - 7 x weekly - 10 reps - 1 sets - 5 hold    Patient Education  Ice  Shoulder Impingement    [facility] Portal  Treatment/Session Summary:    · Response to Treatment:  Pt exhibited slight pain when assessing ROM. When performing manual interventions to the RUE, the pt received more AROM w/o pain. Pt's s/s in the RUE have decreased. Pt noticed a reduce in overall RUE s/s. Pt is progressing into more dynamic RUE shoulder stability exercises    ·  Communication/Consultation:  HEP program  Given to patient. · Equipment provided today:  None today  Recommendations/Intent for next treatment session: Discharge at this time.       Total Treatment Billable Duration:  53 minutes times and [facility]    PT Patient Time In/Time Out  Time In: 1005  Time Out: [address] [place], [place]    Future Appointments   Date Time Provider Reginald Corrigan   8/12/2021  7:00 PM Abbey Gage, [facility]     Visit Approval Visit # Therapist initials Date A NS / Cx < 24 hr >24 hr Cx Comments    1 Arun  6/8/21 [x]  [] [] Initial evaluation    2 GP/Arun 6/14/21 [x] [] []     3 GP/Arun 6/17/21 [x] [] []     4 GP/Arun 6/21/21 [x] [] []     5 GP/Arun 6/24/21 [x] [] []     6 GP/Arun 6/28/21 [x] [] []     7 GP/Arun 7/1/21 [x] [] []        [] [x] [] Canceled     8  GP/Arun 7/7/21 [x] [] []     9 GP/Arun 7/12/21 [x] [] []     10 GP/Arun 7/15/21 [x] [] [] Progress Note     11 GP/Arun 7/19/21 [x] [] [] 1    12 GP/Arun 7/22/21 [x] [] [] 2    13 GP/Arun 7/26/21 [x] [] [] 3    14 GP/Arun 8/2/21 [x] [] [] 4    15 GP/Arun 8/5/21 [x] [] [] 5    16 GP/Arun 8/9/21 [x] [] [] 6       [] [] []

## 2021-08-10 NOTE — PROGRESS NOTES
I am accessing Ms. Velasquez's chart as a part of our department's internal chart auditing process. I certify that Ms. Catarina Dominguez is, or was, a patient in our department.   Thank you,  Familia Brink, PTA  8/10/2021

## 2021-08-12 ENCOUNTER — APPOINTMENT (OUTPATIENT)
Dept: PHYSICAL THERAPY | Age: 81
End: 2021-08-12
Payer: MEDICARE

## 2021-08-12 NOTE — ANCILLARY DISCHARGE INSTRUCTIONS
Denis Vallecillo has been seen in physical therapy from 6/08/21 to 8/9/21 for 16 visits. Treatment has been discontinued at this time due to patient decision to complete PT and move into discharge. The below goals were met prior to discontinuation. Some goals were not met due to patient not completing full scheduled visits. .   Thank you for this referral.   Curtis Wall, PT, DPT, OCS.